# Patient Record
Sex: MALE | Race: WHITE | NOT HISPANIC OR LATINO | Employment: UNEMPLOYED | URBAN - METROPOLITAN AREA
[De-identification: names, ages, dates, MRNs, and addresses within clinical notes are randomized per-mention and may not be internally consistent; named-entity substitution may affect disease eponyms.]

---

## 2018-02-16 ENCOUNTER — OFFICE VISIT (OUTPATIENT)
Dept: OBGYN CLINIC | Facility: CLINIC | Age: 53
End: 2018-02-16
Payer: COMMERCIAL

## 2018-02-16 VITALS
SYSTOLIC BLOOD PRESSURE: 122 MMHG | DIASTOLIC BLOOD PRESSURE: 81 MMHG | BODY MASS INDEX: 27.13 KG/M2 | HEART RATE: 87 BPM | HEIGHT: 68 IN | WEIGHT: 179 LBS

## 2018-02-16 DIAGNOSIS — M70.61 TROCHANTERIC BURSITIS OF RIGHT HIP: Primary | ICD-10-CM

## 2018-02-16 PROCEDURE — 99203 OFFICE O/P NEW LOW 30 MIN: CPT | Performed by: ORTHOPAEDIC SURGERY

## 2018-02-16 NOTE — PROGRESS NOTES
Assessment:     1  Trochanteric bursitis of right hip        Plan:     Problem List Items Addressed This Visit        Musculoskeletal and Integument    Trochanteric bursitis of right hip - Primary     Right hip trochanteric bursitis  Patient was counseled on the diagnosis and treatment options  He did not bring the x-rays in today so was unable to see them  I gave him and counseled him on appropriate stretching  He showed good understanding of this  He would like to try stretching before any injections  He will return in the future if he continues to have problems, but I will need to see x-rays prior to any injections  Subjective:     Patient ID: Natanael William is a 47 y o  male  Chief Complaint:  48-year-old male who comes in today for evaluation of pain in his right hip  This has been going on since late July  It hurts over the lateral aspect of the hip  The pain comes and goes  Things like doing stairs, caring heavy objects aggravated  He can be very painful if he continues to do something that aggravates it  If he sits for 30 seconds or so when it has been aggravated it settles down, but quickly comes back if he continues doing the aggravating activity  He has had no injections  He took tramadol which did not help him at all  At times he does not have any pain in it  It is slowly getting worse  He is anxious to have something done for this  Patient's medical intake form was reviewed      Allergy:  No Known Allergies  Medications:  all current active meds have been reviewed  Past Medical History:  History reviewed  No pertinent past medical history  Past Surgical History:  History reviewed  No pertinent surgical history    Family History:  Family History   Problem Relation Age of Onset    Hypertension Mother     Cancer Father     Hypertension Father      Social History:  History   Alcohol Use    1 2 oz/week    2 Cans of beer per week     History   Drug Use No     History   Smoking Status    Never Smoker   Smokeless Tobacco    Never Used     Review of Systems   Constitutional: Negative  HENT: Negative  Eyes: Negative  Respiratory: Negative  Cardiovascular: Negative  Gastrointestinal: Negative  Endocrine: Negative  Genitourinary: Negative  Musculoskeletal: Positive for arthralgias  Skin: Negative  Allergic/Immunologic: Negative  Neurological: Negative  Hematological: Negative  Psychiatric/Behavioral: Negative  Objective:  BP Readings from Last 1 Encounters:   02/16/18 122/81      Wt Readings from Last 1 Encounters:   02/16/18 81 2 kg (179 lb)      BMI:   Estimated body mass index is 27 22 kg/m² as calculated from the following:    Height as of this encounter: 5' 8" (1 727 m)  Weight as of this encounter: 81 2 kg (179 lb)  BSA:   Estimated body surface area is 1 95 meters squared as calculated from the following:    Height as of this encounter: 5' 8" (1 727 m)  Weight as of this encounter: 81 2 kg (179 lb)  Physical Exam   Constitutional: He is oriented to person, place, and time  He appears well-developed and well-nourished  No distress  HENT:   Head: Normocephalic and atraumatic  Eyes: Right eye exhibits no discharge  Left eye exhibits no discharge  Neck: Normal range of motion  Neck supple  No tracheal deviation present  Cardiovascular: Normal rate and regular rhythm  Pulmonary/Chest: Effort normal  No respiratory distress  Abdominal: Soft  He exhibits no distension  There is no tenderness  Musculoskeletal:   Normal gait   Neurological: He is alert and oriented to person, place, and time  Skin: Skin is warm  He is not diaphoretic  No erythema  Psychiatric: He has a normal mood and affect  His behavior is normal      Right Hip Exam     Tenderness   The patient is experiencing tenderness in the greater trochanter  Range of Motion   The patient has normal right hip ROM      Muscle Strength   The patient has normal right hip strength  Tests   EVELIN: negative    Other   Erythema: absent  Sensation: normal  Pulse: present      Left Hip Exam   Left hip exam is normal     Tenderness   The patient is experiencing no tenderness  Range of Motion   The patient has normal left hip ROM  Muscle Strength   The patient has normal left hip strength  Tests   EVELIN: negative    Other   Erythema: absent  Sensation: normal  Pulse: present            X-rays were not available    Outside x-ray report was reviewed which showed no osteoarthritis and some calcification at the tip of the greater trochanter

## 2018-02-16 NOTE — ASSESSMENT & PLAN NOTE
Right hip trochanteric bursitis  Patient was counseled on the diagnosis and treatment options  He did not bring the x-rays in today so was unable to see them  I gave him and counseled him on appropriate stretching  He showed good understanding of this  He would like to try stretching before any injections  He will return in the future if he continues to have problems, but I will need to see x-rays prior to any injections

## 2021-03-12 ENCOUNTER — APPOINTMENT (EMERGENCY)
Dept: RADIOLOGY | Facility: HOSPITAL | Age: 56
End: 2021-03-12
Payer: COMMERCIAL

## 2021-03-12 ENCOUNTER — HOSPITAL ENCOUNTER (EMERGENCY)
Facility: HOSPITAL | Age: 56
End: 2021-03-12
Attending: EMERGENCY MEDICINE
Payer: COMMERCIAL

## 2021-03-12 ENCOUNTER — HOSPITAL ENCOUNTER (INPATIENT)
Facility: HOSPITAL | Age: 56
LOS: 3 days | Discharge: HOME | DRG: 754 | End: 2021-03-15
Attending: STUDENT IN AN ORGANIZED HEALTH CARE EDUCATION/TRAINING PROGRAM | Admitting: STUDENT IN AN ORGANIZED HEALTH CARE EDUCATION/TRAINING PROGRAM
Payer: COMMERCIAL

## 2021-03-12 VITALS
SYSTOLIC BLOOD PRESSURE: 162 MMHG | DIASTOLIC BLOOD PRESSURE: 97 MMHG | OXYGEN SATURATION: 98 % | TEMPERATURE: 97.9 F | RESPIRATION RATE: 18 BRPM | HEART RATE: 80 BPM

## 2021-03-12 DIAGNOSIS — R45.851 SUICIDAL IDEATIONS: Primary | ICD-10-CM

## 2021-03-12 DIAGNOSIS — R45.851 SUICIDAL IDEATIONS: ICD-10-CM

## 2021-03-12 LAB
ALBUMIN SERPL BCP-MCNC: 4 G/DL (ref 3.5–5)
ALP SERPL-CCNC: 86 U/L (ref 46–116)
ALT SERPL W P-5'-P-CCNC: 43 U/L (ref 12–78)
AMPHETAMINES SERPL QL SCN: NEGATIVE
ANION GAP SERPL CALCULATED.3IONS-SCNC: 10 MMOL/L (ref 4–13)
AST SERPL W P-5'-P-CCNC: 29 U/L (ref 5–45)
ATRIAL RATE: 77 BPM
BARBITURATES UR QL: NEGATIVE
BASOPHILS # BLD AUTO: 0.03 THOUSANDS/ΜL (ref 0–0.1)
BASOPHILS NFR BLD AUTO: 0 % (ref 0–1)
BENZODIAZ UR QL: NEGATIVE
BILIRUB SERPL-MCNC: 1 MG/DL (ref 0.2–1)
BILIRUB UR QL STRIP: NEGATIVE
BUN SERPL-MCNC: 12 MG/DL (ref 5–25)
CALCIUM SERPL-MCNC: 9 MG/DL (ref 8.3–10.1)
CHLORIDE SERPL-SCNC: 101 MMOL/L (ref 100–108)
CLARITY UR: CLEAR
CO2 SERPL-SCNC: 24 MMOL/L (ref 21–32)
COCAINE UR QL: NEGATIVE
COLOR UR: YELLOW
CREAT SERPL-MCNC: 0.87 MG/DL (ref 0.6–1.3)
EOSINOPHIL # BLD AUTO: 0.02 THOUSAND/ΜL (ref 0–0.61)
EOSINOPHIL NFR BLD AUTO: 0 % (ref 0–6)
ERYTHROCYTE [DISTWIDTH] IN BLOOD BY AUTOMATED COUNT: 11.5 % (ref 11.6–15.1)
ETHANOL EXG-MCNC: 0 MG/DL
FLUAV RNA RESP QL NAA+PROBE: NEGATIVE
FLUBV RNA RESP QL NAA+PROBE: NEGATIVE
GFR SERPL CREATININE-BSD FRML MDRD: 96 ML/MIN/1.73SQ M
GLUCOSE SERPL-MCNC: 104 MG/DL (ref 65–140)
GLUCOSE UR STRIP-MCNC: NEGATIVE MG/DL
HCT VFR BLD AUTO: 44.7 % (ref 36.5–49.3)
HGB BLD-MCNC: 15 G/DL (ref 12–17)
HGB UR QL STRIP.AUTO: NEGATIVE
IMM GRANULOCYTES # BLD AUTO: 0.02 THOUSAND/UL (ref 0–0.2)
IMM GRANULOCYTES NFR BLD AUTO: 0 % (ref 0–2)
KETONES UR STRIP-MCNC: ABNORMAL MG/DL
LEUKOCYTE ESTERASE UR QL STRIP: NEGATIVE
LYMPHOCYTES # BLD AUTO: 1.11 THOUSANDS/ΜL (ref 0.6–4.47)
LYMPHOCYTES NFR BLD AUTO: 12 % (ref 14–44)
MCH RBC QN AUTO: 30.7 PG (ref 26.8–34.3)
MCHC RBC AUTO-ENTMCNC: 33.6 G/DL (ref 31.4–37.4)
MCV RBC AUTO: 91 FL (ref 82–98)
METHADONE UR QL: NEGATIVE
MONOCYTES # BLD AUTO: 0.6 THOUSAND/ΜL (ref 0.17–1.22)
MONOCYTES NFR BLD AUTO: 7 % (ref 4–12)
NEUTROPHILS # BLD AUTO: 7.28 THOUSANDS/ΜL (ref 1.85–7.62)
NEUTS SEG NFR BLD AUTO: 81 % (ref 43–75)
NITRITE UR QL STRIP: NEGATIVE
NRBC BLD AUTO-RTO: 0 /100 WBCS
OPIATES UR QL SCN: NEGATIVE
OXYCODONE+OXYMORPHONE UR QL SCN: NEGATIVE
P AXIS: 57 DEGREES
PCP UR QL: NEGATIVE
PH UR STRIP.AUTO: 6 [PH]
PLATELET # BLD AUTO: 176 THOUSANDS/UL (ref 149–390)
PMV BLD AUTO: 9.5 FL (ref 8.9–12.7)
POTASSIUM SERPL-SCNC: 4 MMOL/L (ref 3.5–5.3)
PR INTERVAL: 130 MS
PROT SERPL-MCNC: 6.9 G/DL (ref 6.4–8.2)
PROT UR STRIP-MCNC: NEGATIVE MG/DL
QRS AXIS: 8 DEGREES
QRSD INTERVAL: 86 MS
QT INTERVAL: 364 MS
QTC INTERVAL: 411 MS
RBC # BLD AUTO: 4.89 MILLION/UL (ref 3.88–5.62)
RSV RNA RESP QL NAA+PROBE: NEGATIVE
SARS-COV-2 RNA RESP QL NAA+PROBE: NEGATIVE
SODIUM SERPL-SCNC: 135 MMOL/L (ref 136–145)
SP GR UR STRIP.AUTO: 1.02 (ref 1–1.03)
T WAVE AXIS: 8 DEGREES
THC UR QL: NEGATIVE
TSH SERPL DL<=0.05 MIU/L-ACNC: 0.87 UIU/ML (ref 0.36–3.74)
UROBILINOGEN UR QL STRIP.AUTO: 0.2 E.U./DL
VENTRICULAR RATE: 77 BPM
WBC # BLD AUTO: 9.06 THOUSAND/UL (ref 4.31–10.16)

## 2021-03-12 PROCEDURE — 93010 ELECTROCARDIOGRAM REPORT: CPT | Performed by: INTERNAL MEDICINE

## 2021-03-12 PROCEDURE — 73564 X-RAY EXAM KNEE 4 OR MORE: CPT

## 2021-03-12 PROCEDURE — 99285 EMERGENCY DEPT VISIT HI MDM: CPT | Performed by: EMERGENCY MEDICINE

## 2021-03-12 PROCEDURE — 84443 ASSAY THYROID STIM HORMONE: CPT

## 2021-03-12 PROCEDURE — 73610 X-RAY EXAM OF ANKLE: CPT

## 2021-03-12 PROCEDURE — 0241U HB NFCT DS VIR RESP RNA 4 TRGT: CPT | Performed by: EMERGENCY MEDICINE

## 2021-03-12 PROCEDURE — 80053 COMPREHEN METABOLIC PANEL: CPT

## 2021-03-12 PROCEDURE — 82075 ASSAY OF BREATH ETHANOL: CPT | Performed by: EMERGENCY MEDICINE

## 2021-03-12 PROCEDURE — 81003 URINALYSIS AUTO W/O SCOPE: CPT

## 2021-03-12 PROCEDURE — 99285 EMERGENCY DEPT VISIT HI MDM: CPT

## 2021-03-12 PROCEDURE — 36415 COLL VENOUS BLD VENIPUNCTURE: CPT

## 2021-03-12 PROCEDURE — 80307 DRUG TEST PRSMV CHEM ANLYZR: CPT | Performed by: EMERGENCY MEDICINE

## 2021-03-12 PROCEDURE — 85025 COMPLETE CBC W/AUTO DIFF WBC: CPT

## 2021-03-12 PROCEDURE — 93005 ELECTROCARDIOGRAM TRACING: CPT

## 2021-03-12 RX ORDER — HYDROXYZINE HYDROCHLORIDE 25 MG/1
50 TABLET, FILM COATED ORAL
Status: CANCELLED | OUTPATIENT
Start: 2021-03-12

## 2021-03-12 RX ORDER — IBUPROFEN 600 MG/1
600 TABLET ORAL ONCE
Status: COMPLETED | OUTPATIENT
Start: 2021-03-12 | End: 2021-03-12

## 2021-03-12 RX ORDER — LORAZEPAM 2 MG/ML
2 INJECTION INTRAMUSCULAR
Status: CANCELLED | OUTPATIENT
Start: 2021-03-12

## 2021-03-12 RX ORDER — LORAZEPAM 2 MG/ML
1 INJECTION INTRAMUSCULAR EVERY 4 HOURS PRN
Status: CANCELLED | OUTPATIENT
Start: 2021-03-12

## 2021-03-12 RX ORDER — BENZTROPINE MESYLATE 1 MG/ML
0.5 INJECTION INTRAMUSCULAR; INTRAVENOUS
Status: DISCONTINUED | OUTPATIENT
Start: 2021-03-12 | End: 2021-03-15 | Stop reason: HOSPADM

## 2021-03-12 RX ORDER — HALOPERIDOL 1 MG/1
1 TABLET ORAL EVERY 6 HOURS PRN
Status: CANCELLED | OUTPATIENT
Start: 2021-03-12

## 2021-03-12 RX ORDER — HALOPERIDOL 5 MG
5 TABLET ORAL
Status: CANCELLED | OUTPATIENT
Start: 2021-03-12

## 2021-03-12 RX ORDER — POLYETHYLENE GLYCOL 3350 17 G/17G
17 POWDER, FOR SOLUTION ORAL DAILY PRN
Status: DISCONTINUED | OUTPATIENT
Start: 2021-03-12 | End: 2021-03-15 | Stop reason: HOSPADM

## 2021-03-12 RX ORDER — BISACODYL 10 MG
10 SUPPOSITORY, RECTAL RECTAL DAILY PRN
Status: CANCELLED | OUTPATIENT
Start: 2021-03-12

## 2021-03-12 RX ORDER — BISACODYL 10 MG
10 SUPPOSITORY, RECTAL RECTAL DAILY PRN
Status: DISCONTINUED | OUTPATIENT
Start: 2021-03-12 | End: 2021-03-15 | Stop reason: HOSPADM

## 2021-03-12 RX ORDER — HALOPERIDOL 5 MG
5 TABLET ORAL
Status: DISCONTINUED | OUTPATIENT
Start: 2021-03-12 | End: 2021-03-15 | Stop reason: HOSPADM

## 2021-03-12 RX ORDER — HALOPERIDOL 1 MG/1
1 TABLET ORAL EVERY 6 HOURS PRN
Status: DISCONTINUED | OUTPATIENT
Start: 2021-03-12 | End: 2021-03-15 | Stop reason: HOSPADM

## 2021-03-12 RX ORDER — IBUPROFEN 400 MG/1
400 TABLET ORAL EVERY 6 HOURS PRN
Status: DISCONTINUED | OUTPATIENT
Start: 2021-03-12 | End: 2021-03-15 | Stop reason: HOSPADM

## 2021-03-12 RX ORDER — LORAZEPAM 2 MG/ML
2 INJECTION INTRAMUSCULAR
Status: DISCONTINUED | OUTPATIENT
Start: 2021-03-12 | End: 2021-03-15 | Stop reason: HOSPADM

## 2021-03-12 RX ORDER — IBUPROFEN 600 MG/1
600 TABLET ORAL EVERY 8 HOURS PRN
Status: CANCELLED | OUTPATIENT
Start: 2021-03-12

## 2021-03-12 RX ORDER — MAGNESIUM HYDROXIDE/ALUMINUM HYDROXICE/SIMETHICONE 120; 1200; 1200 MG/30ML; MG/30ML; MG/30ML
30 SUSPENSION ORAL EVERY 4 HOURS PRN
Status: CANCELLED | OUTPATIENT
Start: 2021-03-12

## 2021-03-12 RX ORDER — LORAZEPAM 2 MG/ML
1 INJECTION INTRAMUSCULAR
Status: DISCONTINUED | OUTPATIENT
Start: 2021-03-12 | End: 2021-03-15 | Stop reason: HOSPADM

## 2021-03-12 RX ORDER — BENZTROPINE MESYLATE 1 MG/ML
1 INJECTION INTRAMUSCULAR; INTRAVENOUS
Status: CANCELLED | OUTPATIENT
Start: 2021-03-12

## 2021-03-12 RX ORDER — POLYETHYLENE GLYCOL 3350 17 G/17G
17 POWDER, FOR SOLUTION ORAL DAILY PRN
Status: CANCELLED | OUTPATIENT
Start: 2021-03-12

## 2021-03-12 RX ORDER — HYDROXYZINE HYDROCHLORIDE 25 MG/1
25 TABLET, FILM COATED ORAL
Status: DISCONTINUED | OUTPATIENT
Start: 2021-03-12 | End: 2021-03-15 | Stop reason: HOSPADM

## 2021-03-12 RX ORDER — LORAZEPAM 1 MG/1
1 TABLET ORAL
Status: DISCONTINUED | OUTPATIENT
Start: 2021-03-12 | End: 2021-03-15 | Stop reason: HOSPADM

## 2021-03-12 RX ORDER — MINERAL OIL AND PETROLATUM 150; 830 MG/G; MG/G
1 OINTMENT OPHTHALMIC
Status: CANCELLED | OUTPATIENT
Start: 2021-03-12

## 2021-03-12 RX ORDER — HYDROXYZINE HYDROCHLORIDE 25 MG/1
25 TABLET, FILM COATED ORAL
Status: CANCELLED | OUTPATIENT
Start: 2021-03-12

## 2021-03-12 RX ORDER — HALOPERIDOL 5 MG/ML
5 INJECTION INTRAMUSCULAR
Status: DISCONTINUED | OUTPATIENT
Start: 2021-03-12 | End: 2021-03-15 | Stop reason: HOSPADM

## 2021-03-12 RX ORDER — MAGNESIUM HYDROXIDE/ALUMINUM HYDROXICE/SIMETHICONE 120; 1200; 1200 MG/30ML; MG/30ML; MG/30ML
30 SUSPENSION ORAL EVERY 4 HOURS PRN
Status: DISCONTINUED | OUTPATIENT
Start: 2021-03-12 | End: 2021-03-15 | Stop reason: HOSPADM

## 2021-03-12 RX ORDER — TRAZODONE HYDROCHLORIDE 50 MG/1
50 TABLET ORAL
Status: CANCELLED | OUTPATIENT
Start: 2021-03-12

## 2021-03-12 RX ORDER — AMOXICILLIN 250 MG
1 CAPSULE ORAL DAILY PRN
Status: DISCONTINUED | OUTPATIENT
Start: 2021-03-12 | End: 2021-03-15 | Stop reason: HOSPADM

## 2021-03-12 RX ORDER — LORAZEPAM 2 MG/ML
1 INJECTION INTRAMUSCULAR
Status: CANCELLED | OUTPATIENT
Start: 2021-03-12

## 2021-03-12 RX ORDER — HALOPERIDOL 5 MG/ML
2.5 INJECTION INTRAMUSCULAR
Status: CANCELLED | OUTPATIENT
Start: 2021-03-12

## 2021-03-12 RX ORDER — ACETAMINOPHEN 325 MG/1
650 TABLET ORAL EVERY 6 HOURS PRN
Status: CANCELLED | OUTPATIENT
Start: 2021-03-12

## 2021-03-12 RX ORDER — LORAZEPAM 1 MG/1
1 TABLET ORAL
Status: CANCELLED | OUTPATIENT
Start: 2021-03-12

## 2021-03-12 RX ORDER — IBUPROFEN 400 MG/1
400 TABLET ORAL EVERY 6 HOURS PRN
Status: CANCELLED | OUTPATIENT
Start: 2021-03-12

## 2021-03-12 RX ORDER — HALOPERIDOL 5 MG/ML
2.5 INJECTION INTRAMUSCULAR
Status: DISCONTINUED | OUTPATIENT
Start: 2021-03-12 | End: 2021-03-15 | Stop reason: HOSPADM

## 2021-03-12 RX ORDER — ACETAMINOPHEN 325 MG/1
650 TABLET ORAL EVERY 6 HOURS PRN
Status: DISCONTINUED | OUTPATIENT
Start: 2021-03-12 | End: 2021-03-15 | Stop reason: HOSPADM

## 2021-03-12 RX ORDER — AMOXICILLIN 250 MG
1 CAPSULE ORAL DAILY PRN
Status: CANCELLED | OUTPATIENT
Start: 2021-03-12

## 2021-03-12 RX ORDER — HALOPERIDOL 5 MG
2.5 TABLET ORAL
Status: DISCONTINUED | OUTPATIENT
Start: 2021-03-12 | End: 2021-03-15 | Stop reason: HOSPADM

## 2021-03-12 RX ORDER — BENZTROPINE MESYLATE 1 MG/1
1 TABLET ORAL
Status: DISCONTINUED | OUTPATIENT
Start: 2021-03-12 | End: 2021-03-15 | Stop reason: HOSPADM

## 2021-03-12 RX ORDER — LORAZEPAM 2 MG/ML
1 INJECTION INTRAMUSCULAR EVERY 4 HOURS PRN
Status: DISCONTINUED | OUTPATIENT
Start: 2021-03-12 | End: 2021-03-15 | Stop reason: HOSPADM

## 2021-03-12 RX ORDER — MINERAL OIL AND PETROLATUM 150; 830 MG/G; MG/G
1 OINTMENT OPHTHALMIC
Status: DISCONTINUED | OUTPATIENT
Start: 2021-03-12 | End: 2021-03-15 | Stop reason: HOSPADM

## 2021-03-12 RX ORDER — BENZTROPINE MESYLATE 0.5 MG/1
1 TABLET ORAL
Status: CANCELLED | OUTPATIENT
Start: 2021-03-12

## 2021-03-12 RX ORDER — BENZTROPINE MESYLATE 1 MG/ML
0.5 INJECTION INTRAMUSCULAR; INTRAVENOUS
Status: CANCELLED | OUTPATIENT
Start: 2021-03-12

## 2021-03-12 RX ORDER — BENZTROPINE MESYLATE 1 MG/ML
1 INJECTION INTRAMUSCULAR; INTRAVENOUS
Status: DISCONTINUED | OUTPATIENT
Start: 2021-03-12 | End: 2021-03-15 | Stop reason: HOSPADM

## 2021-03-12 RX ORDER — IBUPROFEN 600 MG/1
600 TABLET ORAL EVERY 8 HOURS PRN
Status: DISCONTINUED | OUTPATIENT
Start: 2021-03-12 | End: 2021-03-15 | Stop reason: HOSPADM

## 2021-03-12 RX ORDER — HALOPERIDOL 5 MG/ML
5 INJECTION INTRAMUSCULAR
Status: CANCELLED | OUTPATIENT
Start: 2021-03-12

## 2021-03-12 RX ORDER — HYDROXYZINE 50 MG/1
50 TABLET, FILM COATED ORAL
Status: DISCONTINUED | OUTPATIENT
Start: 2021-03-12 | End: 2021-03-15 | Stop reason: HOSPADM

## 2021-03-12 RX ORDER — TRAZODONE HYDROCHLORIDE 50 MG/1
50 TABLET ORAL
Status: DISCONTINUED | OUTPATIENT
Start: 2021-03-12 | End: 2021-03-15 | Stop reason: HOSPADM

## 2021-03-12 RX ADMIN — IBUPROFEN 600 MG: 600 TABLET, FILM COATED ORAL at 07:27

## 2021-03-12 RX ADMIN — IBUPROFEN 600 MG: 600 TABLET, FILM COATED ORAL at 17:08

## 2021-03-12 NOTE — ED NOTES
Insurance Authorization for admission:   Phone call placed to [employer] number: [phone]     Spoke to Rajesh   4 days approved  Level of care: inpatient psych  Review on **  Authorization # Accepting facility will need to call for auth number upon admission     EVS (Eligibility Verification System) called - [phone]    Automated system indicates: Brianne Cardona

## 2021-03-12 NOTE — ED PROVIDER NOTES
History  Chief Complaint   Patient presents with    Psychiatric Evaluation     Pt borught in by state police after texting his estranged wife catherine he was going to hang himself  64 yo M presents to ED with police for eval of suicidal threats  He apparently was having an affair, and his wife found out about it  She cleared out bank accounts and moved out, and when he found out today, he sent a text message to son and wife stating he was going to kill himself  Wife called police for a well check, and police found him with a rope next to a chair by the stairs  He came willingly for evaluation  He states to me he was just having a bad day, and that he wouldn't actually hurt himself  Denies homicidal ideation or hallucinations  Is cooperative  Denies smoking/drinking/drugs  History provided by:  Patient and medical records  Psychiatric Evaluation  Presenting symptoms: suicidal thoughts and suicidal threats    Patient accompanied by:  [place] enforcement  Timing:  Constant  Chronicity:  New  Context: stressful life event    Relieved by:  None tried  Worsened by:  Nothing  Ineffective treatments:  None tried  Associated symptoms: no abdominal pain, no anxiety, no chest pain, no fatigue and no headaches        None       History reviewed  No pertinent past medical history  History reviewed  No pertinent surgical history  Family History   Problem Relation Age of Onset    Hypertension Mother     Cancer Father     Hypertension Father      I have reviewed and agree with the history as documented  E-Cigarette/Vaping     E-Cigarette/Vaping Substances     Social History     Tobacco Use    Smoking status: Never Smoker    Smokeless tobacco: Never Used   Substance Use Topics    Alcohol use: Yes     Alcohol/week: 2 0 standard drinks     Types: 2 Cans of beer per week    Drug use: No       Review of Systems   Constitutional: Negative for chills, diaphoresis, fatigue, fever and unexpected weight change     HENT: Negative for congestion, ear pain, rhinorrhea, sore throat, trouble swallowing and voice change  Eyes: Negative for pain and visual disturbance  Respiratory: Negative for cough, chest tightness and shortness of breath  Cardiovascular: Negative for chest pain, palpitations and leg swelling  Gastrointestinal: Negative for abdominal pain, blood in stool, constipation, diarrhea, nausea and vomiting  Genitourinary: Negative for difficulty urinating and hematuria  Musculoskeletal: Negative for arthralgias, back pain and neck pain  Skin: Negative for rash  Neurological: Negative for dizziness, syncope, light-headedness and headaches  Psychiatric/Behavioral: Positive for suicidal ideas  Negative for confusion  The patient is not nervous/anxious  Physical Exam  Physical Exam  Vitals signs and nursing note reviewed  Exam conducted with a chaperone present (nurse Ed)  Constitutional:       General: He is not in acute distress  Appearance: He is well-developed  He is not diaphoretic  HENT:      Head: Normocephalic and atraumatic  Right Ear: External ear normal       Left Ear: External ear normal       Nose: Nose normal    Eyes:      General: No scleral icterus  Right eye: No discharge  Left eye: No discharge  Conjunctiva/sclera: Conjunctivae normal       Pupils: Pupils are equal, round, and reactive to light  Neck:      Musculoskeletal: Normal range of motion and neck supple  Vascular: No JVD  Trachea: No tracheal deviation  Cardiovascular:      Rate and Rhythm: Normal rate and regular rhythm  Heart sounds: Normal heart sounds  No murmur  No friction rub  No gallop  Pulmonary:      Effort: Pulmonary effort is normal  No respiratory distress  Breath sounds: Normal breath sounds  No stridor  No wheezing or rales  Chest:      Chest wall: No tenderness  Abdominal:      General: Bowel sounds are normal  There is no distension        Palpations: Abdomen is soft  Tenderness: There is no abdominal tenderness  There is no guarding or rebound  Musculoskeletal: Normal range of motion  General: No deformity  Right knee: He exhibits normal range of motion, no swelling, no effusion, no ecchymosis, no deformity, no laceration, no erythema, normal alignment, no LCL laxity, normal patellar mobility and no MCL laxity  Tenderness found  Medial joint line tenderness noted  Right ankle: He exhibits normal range of motion, no swelling, no ecchymosis, no deformity, no laceration and normal pulse  Tenderness  Lateral malleolus tenderness found  No medial malleolus, no AITFL, no CF ligament, no posterior TFL, no head of 5th metatarsal and no proximal fibula tenderness found  Lymphadenopathy:      Cervical: No cervical adenopathy  Skin:     General: Skin is warm and dry  Findings: No rash  Neurological:      General: No focal deficit present  Mental Status: He is alert and oriented to person, place, and time  Cranial Nerves: No cranial nerve deficit  Sensory: No sensory deficit        Coordination: Coordination normal    Psychiatric:         Behavior: Behavior normal          Vital Signs  ED Triage Vitals [03/12/21 0514]   Temperature Pulse Respirations Blood Pressure SpO2   97 9 °F (36 6 °C) 99 19 137/72 98 %      Temp Source Heart Rate Source Patient Position - Orthostatic VS BP Location FiO2 (%)   Tympanic Monitor Lying Left arm --      Pain Score       --           Vitals:    03/12/21 0514   BP: 137/72   Pulse: 99   Patient Position - Orthostatic VS: Lying         Visual Acuity  Visual Acuity      Most Recent Value   L Pupil Size (mm)  4   R Pupil Size (mm)  4          ED Medications  Medications   ibuprofen (MOTRIN) tablet 600 mg (has no administration in time range)       Diagnostic Studies  Results Reviewed     Procedure Component Value Units Date/Time    COVID19, Influenza A/B, RSV PCR, [facility] [[id number]] Collected: 03/12/21 0534    Lab Status: In process Specimen: Nares from Nasopharyngeal Swab Updated: 03/12/21 0612    Rapid drug screen, urine [[id number]]  (Normal) Collected: 03/12/21 0527    Lab Status: Final result Specimen: Urine, Clean Catch Updated: 03/12/21 0546     Amph/Meth UR Negative     Barbiturate Ur Negative     Benzodiazepine Urine Negative     Cocaine Urine Negative     Methadone Urine Negative     Opiate Urine Negative     PCP Ur Negative     THC Urine Negative     Oxycodone Urine Negative    Narrative:      FOR MEDICAL PURPOSES ONLY  IF CONFIRMATION NEEDED PLEASE CONTACT THE LAB WITHIN 5 DAYS  Drug Screen Cutoff Levels:  AMPHETAMINE/METHAMPHETAMINES  1000 ng/mL  BARBITURATES     200 ng/mL  BENZODIAZEPINES     200 ng/mL  COCAINE      300 ng/mL  METHADONE      300 ng/mL  OPIATES      300 ng/mL  PHENCYCLIDINE     25 ng/mL  THC       50 ng/mL  OXYCODONE      100 ng/mL    POCT alcohol breath test [[id number]]  (Normal) Resulted: 03/12/21 0534    Lab Status: Final result Updated: 03/12/21 0534     EXTBreath Alcohol 0 00                 XR knee 4+ vw right injury    (Results Pending)   XR ankle 3+ views RIGHT    (Results Pending)              Procedures  Procedures         ED Course  ED Course as of Mar 12 0703   Fri Mar 12, 2021   0724 Faxed 302 to Srutih Mahoney for evaluation  [id number] completed  Belinda Hya is going to send to the [facility]  Pt has been calm and cooperative  Medically cleared  I think he would be willing to sign a 201        0702 Pt now states he tripped on stairs last night, twisted R ankle and knee  No fall or other injury  NV intact  Mild TTP  Will check plain films, motrin                                                 MDM  Number of Diagnoses or Management Options  Suicidal ideations: new and requires workup     Amount and/or Complexity of Data Reviewed  Clinical lab tests: ordered and reviewed  Obtain history from someone other than the patient: yes  Review and summarize past medical records: yes    Risk of Complications, Morbidity, and/or Mortality  Presenting problems: moderate  Diagnostic procedures: low  Management options: low    Patient Progress  Patient progress: stable      Disposition  Final diagnoses:   Suicidal ideations     Time reflects when diagnosis was documented in both MDM as applicable and the Disposition within this note     Time User Action Codes Description Comment    3/12/2021  6:47 AM Xochitl Raw Add [L76 779] Suicidal ideations       ED Disposition     None      Follow-up Information    None         Patient's Medications    No medications on file     No discharge procedures on file      PDMP Review     None          ED Provider  Electronically Signed by           Lauren Robles MD  03/12/21 MD Chet  03/12/21 9623

## 2021-03-12 NOTE — ED NOTES
Transportation arranged with [facility] for 1830       Sumanth Rabago, TIANA  03/12/21   0212

## 2021-03-12 NOTE — EMTALA/ACUTE CARE TRANSFER
[facility] EMERGENCY DEPARTMENT  [address]  [facility] [address]  Dept: [phone]      AWEDQI TRANSFER CONSENT    NAME Gabriel Ramirez                                         1965                              MRN [id number]    I have been informed of my rights regarding examination, treatment, and transfer   by Dr Cuenca att  providers found    Benefits: Specialized equipment and/or services available at the receiving facility (Include comment)________________________    Risks: Potential for delay in receiving treatment      Consent for Transfer:  I acknowledge that my medical condition has been evaluated and explained to me by the emergency department physician or other qualified medical person and/or my attending physician, who has recommended that I be transferred to the service of  Accepting Physician: Dr Emperatriz Edwards at [address] [place] [address] Rd Name, [address] : [address]  The above potential benefits of such transfer, the potential risks associated with such transfer, and the probable risks of not being transferred have been explained to me, and I fully understand them  The doctor has explained that, in my case, the benefits of transfer outweigh the risks  I agree to be transferred  I authorize the performance of emergency medical procedures and treatments upon me in both transit and upon arrival at the receiving facility  Additionally, I authorize the release of any and all medical records to the receiving facility and request they be transported with me, if possible  I understand that the safest mode of transportation during a medical emergency is an ambulance and that the Hospital advocates the use of this mode of transport  Risks of traveling to the receiving facility by car, including absence of medical control, life sustaining equipment, such as oxygen, and medical personnel has been explained to me and I fully understand them      ([address] [place])  [ ]  I consent to the stated transfer and to be transported by ambulance/helicopter  [  ]  I consent to the stated transfer, but refuse transportation by ambulance and accept full responsibility for my transportation by car  I understand the risks of non-ambulance transfers and I exonerate the Hospital and its staff from any deterioration in my condition that results from this refusal     X___________________________________________    DATE  21  TIME________  Signature of patient or legally responsible individual signing on patient behalf           RELATIONSHIP TO PATIENT_________________________          Provider Certification    NAME Abdiel Palomino                                         1965                              MRN [id number]    A medical screening exam was performed on the above named patient  Based on the examination:    Condition Necessitating Transfer The encounter diagnosis was Suicidal ideations  Patient Condition: The patient has been stabilized such that within reasonable medical probability, no material deterioration of the patient condition or the condition of the unborn child(brenden) is likely to result from the transfer    Reason for Transfer: Level of Care needed not available at this facility    Transfer Requirements: [facility] [address] 2w   · Space available and qualified personnel available for treatment as acknowledged by    · Agreed to accept transfer and to provide appropriate medical treatment as acknowledged by       Dr Sadaf Sampson  · Appropriate medical records of the examination and treatment of the patient are provided at the time of transfer   [facility], [address] _______  · Transfer will be performed by qualified personnel from [address] [place] [address]  and appropriate transfer equipment as required, including the use of necessary and appropriate life support measures      Provider Certification: I have examined the patient and explained the following risks and benefits of being transferred/refusing transfer to the patient/family:  General risk, such as traffic hazards, adverse weather conditions, rough terrain or turbulence, possible failure of equipment (including vehicle or aircraft), or consequences of actions of persons outside the control of the transport personnel, Unanticipated needs of medical equipment and personnel during transport, Risk of worsening condition, The possibility of a transport vehicle being unavailable      Based on these reasonable risks and benefits to the patient and/or the unborn child(brenden), and based upon the information available at the time of the patients examination, I certify that the medical benefits reasonably to be expected from the provision of appropriate medical treatments at another medical facility outweigh the increasing risks, if any, to the individuals medical condition, and in the case of labor to the unborn child, from effecting the transfer      X____________________________________________ DATE 03/12/21        TIME_______      ORIGINAL - SEND TO MEDICAL RECORDS   COPY - SEND WITH PATIENT DURING TRANSFER

## 2021-03-12 NOTE — ED NOTES
Pt was brought to the ed after his wife called police for a welfare check  Pt stated his wife found out he was having an affair and took her things and left the home  Pt reports she also locked their bank accounts after taking most of their money  Pt attempted to call and text his wife to discuss the situation but she wouldn't respond  Pt then texted her and his son that he was going to kill himself  The wife then responded and called 911  When police arrived, the found pt next to a chair with an untied rope  Pt said he was not going to kill himself and only staged the rope and chair so his wife would believe that he was actually suicidal  Pt has no psych hx and denies si, hi or hallucinations  Police brought pt to the ed and petitioned a 36 through [employer]  Pt stated the rope wasn't even long enough to hang himself and is requesting d/c home  However, pt admits that he knows the police petitioned a 593 and is willing to sign a 201 for inpatient tx  Pt denies any D&A use  Appetite and sleep are reported as fair  He is currently calm and cooperative  No other stressors were reported

## 2021-03-12 NOTE — ED NOTES
302 completed with [facility] delegate Johnathan Garcia Dr completed and copy was sent to [facility] along with notification form

## 2021-03-12 NOTE — ED NOTES
Pt ambulated to and from restroom without incident  Upon returning to room pt reported that he had pain in his knee, Dr Leo Hernandez notified       Coco Gregorio  03/12/21 0701

## 2021-03-12 NOTE — ED NOTES
Patient is accepted at [facility] 2w  Patient is accepted by [facility] PAUL[facility] Mark per Naz Vergara time to transport      Nurse report is to be called to [phone] prior to patient transfer

## 2021-03-12 NOTE — ED NOTES
Pt is calm and cooperative, currently on the phone with "Heriberto from [facility]"  Breakfast tray ordered        Rosales Contreras  03/12/21 5201

## 2021-03-13 PROBLEM — F43.20 ADJUSTMENT DISORDER: Status: ACTIVE | Noted: 2021-03-13

## 2021-03-13 PROBLEM — F43.21 ADJUSTMENT DISORDER WITH DEPRESSED MOOD: Status: ACTIVE | Noted: 2021-03-13

## 2021-03-13 PROBLEM — S93.401A RIGHT ANKLE SPRAIN: Status: ACTIVE | Noted: 2021-03-13

## 2021-03-13 PROBLEM — Z00.8 MEDICAL CLEARANCE FOR PSYCHIATRIC ADMISSION: Status: ACTIVE | Noted: 2021-03-13

## 2021-03-13 PROCEDURE — 99253 IP/OBS CNSLTJ NEW/EST LOW 45: CPT | Performed by: PHYSICIAN ASSISTANT

## 2021-03-13 PROCEDURE — 99222 1ST HOSP IP/OBS MODERATE 55: CPT | Performed by: STUDENT IN AN ORGANIZED HEALTH CARE EDUCATION/TRAINING PROGRAM

## 2021-03-13 RX ORDER — BENZONATATE 100 MG/1
100 CAPSULE ORAL 3 TIMES DAILY PRN
Status: DISCONTINUED | OUTPATIENT
Start: 2021-03-13 | End: 2021-03-15 | Stop reason: HOSPADM

## 2021-03-13 RX ADMIN — BENZONATATE 100 MG: 100 CAPSULE ORAL at 18:13

## 2021-03-13 RX ADMIN — IBUPROFEN 600 MG: 600 TABLET, FILM COATED ORAL at 09:42

## 2021-03-13 NOTE — PLAN OF CARE
Problem: ANXIETY  Goal: Will report anxiety at manageable levels  Description: INTERVENTIONS:  - Administer medication as ordered  - Teach and encourage coping skills  - Provide emotional support  - Assess patient/family for anxiety and ability to cope  Outcome: Progressing     Problem: Risk for Self Injury/Neglect  Goal: Verbalize thoughts and feelings  Description: Interventions:  - Assess and re-assess patient's lethality and potential for self-injury  - Engage patient in 1:1 interactions, daily, for a minimum of 15 minutes  - Encourage patient to express feelings, fears, frustrations, hopes  - Establish rapport/trust with patient   Outcome: Progressing  Goal: Refrain from harming self  Description: Interventions:  - Monitor patient closely, per order  - Develop a trusting relationship  - Supervise medication ingestion, monitor effects and side effects   Outcome: Progressing     Problem: DEPRESSION  Goal: Will be euthymic at discharge  Description: INTERVENTIONS:  - Administer medication as ordered  - Provide emotional support via 1:1 interaction with staff  - Encourage involvement in milieu/groups/activities  - Monitor for social isolation  Outcome: Progressing

## 2021-03-13 NOTE — CONSULTS
[address] [facility] [address] 1965, 63 y o  male MRN: [id number]  Unit/Bed#: Jonathon Barroso 260-02 Encounter: [id number]  Primary Care Provider: No primary care provider on file  Date and time admitted to hospital: 3/12/2021  6:56 PM    Inpatient consult for Medical Clearance for [address] patient  Consult performed by: Silviano Briones PA-C  Consult ordered by: Theresa Paniagua PA-C          Medical clearance for psychiatric admission  Assessment & Plan  · Admission labs reviewed, acceptable  · COVID-19 negative   ·  UDS negative  · EKG on admission, NSR,    · Medically stable for continued inpatient psychiatric treatment    Right ankle sprain  Assessment & Plan  · Occurred prior to admission   · X-rays in emergency department negative for any fracture   · Continue supportive care with Tylenol and Motrin      VTE Prophylaxis: Reason for no pharmacologic prophylaxis low risk  / reason for no mechanical VTE prophylaxis ambulatory     Recommendations for Discharge:  ·  discharge timeline per primary team   Routine follow-up with primary care provider  Counseling / Coordination of Care Time: 30 minutes  Greater than 50% of total time spent on patient counseling and coordination of care  Collaboration of Care: Were Recommendations Directly Discussed with Primary Treatment Team? - No     History of Present Illness:    Anny Shepherd is a 63 y o  male who is originally admitted to the   Behavioral health service due to  Suicidal ideation  We are consulted for  Management of chronic medical conditions  Patient denies any chronic medical conditions for which he takes daily medications  Patient denies recent travel, illness or sick contacts  Patient denies  Tobacco or drug use, is a social drinker     Available admission lab work and vitals are acceptable  Patient feels a baseline physical health,   With the exception of ankle sprain which was x-rayed in emergency department  Patient appears medically stable for inpatient psychiatric treatment at this time  Review of Systems:    Review of Systems   Musculoskeletal: Positive for arthralgias  All other systems reviewed and are negative  Past Medical and Surgical History:     History reviewed  No pertinent past medical history  No past surgical history on file  Meds/Allergies:    PTA meds:   None       Allergies: No Known Allergies    Social History:     Marital Status: /Civil Union    Substance Use History:   Social History     Substance and Sexual Activity   Alcohol Use Yes    Alcohol/week: 2 0 standard drinks    Types: 2 Cans of beer per week     Social History     Tobacco Use   Smoking Status Never Smoker   Smokeless Tobacco Never Used     Social History     Substance and Sexual Activity   Drug Use No       Family History:    Family History   Problem Relation Age of Onset    Hypertension Mother     Cancer Father     Hypertension Father        Physical Exam:     Vitals:   Blood Pressure: 122/76 (03/13/21 0740)  Pulse: 94 (03/13/21 0740)  Temperature: (!) 97 4 °F (36 3 °C) (03/13/21 0740)  Temp Source: Tympanic (03/13/21 0740)  Respirations: 18 (03/13/21 0740)  Height: 5' 9" (175 3 cm) (03/12/21 1915)  Weight - Scale: 74 4 kg (164 lb) (03/12/21 1915)  SpO2: 97 % (03/12/21 1915)    Physical Exam  Constitutional:       General: He is awake  He is not in acute distress  HENT:      Head: Normocephalic and atraumatic  Cardiovascular:      Rate and Rhythm: Normal rate and regular rhythm  Heart sounds: No murmur  Pulmonary:      Effort: Pulmonary effort is normal       Breath sounds: Normal breath sounds  Abdominal:      General: There is no distension  Palpations: Abdomen is soft  Musculoskeletal:      Right lower leg: No edema  Left lower leg: No edema  Skin:     General: Skin is warm and dry  Neurological:      Mental Status: He is alert        Comments: CN II-XII grossly intact Additional Data:     Lab Results: I have personally reviewed pertinent reports  Results from last 7 days   Lab Units 03/12/21  0926   WBC Thousand/uL 9 06   HEMOGLOBIN g/dL 15 0   HEMATOCRIT % 44 7   PLATELETS Thousands/uL 176   NEUTROS PCT % 81*   LYMPHS PCT % 12*   MONOS PCT % 7   EOS PCT % 0     Results from last 7 days   Lab Units 03/12/21  0926   SODIUM mmol/L 135*   POTASSIUM mmol/L 4 0   CHLORIDE mmol/L 101   CO2 mmol/L 24   BUN mg/dL 12   CREATININE mg/dL 0 87   ANION GAP mmol/L 10   CALCIUM mg/dL 9 0   ALBUMIN g/dL 4 0   TOTAL BILIRUBIN mg/dL 1 00   ALK PHOS U/L 86   ALT U/L 43   AST U/L 29   GLUCOSE RANDOM mg/dL 104             No results found for: HGBA1C            Imaging: I have personally reviewed pertinent reports  No orders to display       EKG, Pathology, and Other Studies Reviewed on Admission:   · EKG: NSR,     ** Please Note: This note has been constructed using a voice recognition system   **

## 2021-03-13 NOTE — PROGRESS NOTES
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

## 2021-03-13 NOTE — PROGRESS NOTES
Pt remains pleasant, calm and cooperative  Visible in the milieu, throughout the day  Social with peers  Attending some groups  Denies SI/HI/AVH  C/o a new cough and chest congestion, MD notified and PRN benzonatate-100mg was ordered  Pt denies questions but is concerned about personal belonging to be brought in by spouse (Belongins did arrive, placed in copy room and awaiting contraband check at time of note)

## 2021-03-13 NOTE — TREATMENT PLAN
RN will meet with the patient at least twice per day to assess any concerns and will give education on diagnosis, prescribed medications, and healthy coping skills  RN will encourage and support heathy coping skills and group attendance

## 2021-03-13 NOTE — TREATMENT TEAM
Pt informed RN will meet with pt at least twice daily to educate on medication, diagnosis and assess for symptoms  Pt given tour of unit

## 2021-03-13 NOTE — PROGRESS NOTES
Pt 201 SLUB-ED for depression after marital issues, wife left the home and patient sent text to wife stating he was going to hang himself hoping she would talk with him  Pt wife call the police  Police found the patient in his home with a chair and rope  Patient reports that the chair and rope were a prop and he did not have intent to hang himself  Police brought pt to the ED  Pt denies drug use and is a non tobacco smoker  He reports drinking "a couple of beers" three to four times a week  UDS and BAT negative  Pt has no psych hx and is not currently on any medications  He denies access to weapons  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

## 2021-03-13 NOTE — ASSESSMENT & PLAN NOTE
· Occurred prior to admission   · X-rays in emergency department negative for any fracture   · Continue supportive care with Tylenol and Motrin

## 2021-03-13 NOTE — TREATMENT PLAN
TREATMENT PLAN REVIEW - 809 Sofiya Garay Born 63 y o  1965 male MRN: [id number]    [address] [place] [address] Room / Bed: [place] [address]/[facility] 199-68 Encounter: [id number]          Admit Date/Time:  3/12/2021  6:56 PM    Treatment Team: Attending Provider: Amy Avalos MD; Consulting Physician: Juan Carlos Agustin MD; Registered Nurse: Kathi Burkett RN; Security: [email or web address]; Patient Care Assistant: Man Chavis;  Patient Care Assistant: Fernando [email or web address]; Patient Care Technician: Sarahy Hassan; Registered Nurse: Ewa Flores RN; Patient Care Assistant: Fawn Barfield    Diagnosis: Principal Problem:    Adjustment disorder with depressed mood  Active Problems:    Medical clearance for psychiatric admission    Right ankle sprain    Patient Strengths/Assets: average or above intelligence, capable of independent living, cooperative, communication skills     Patient Barriers/Limitations: family conflict, marital conflict    Short Term Goals: decrease in depressive symptoms, decrease in anxiety symptoms    Long Term Goals: improvement in depression    Progress Towards Goals: attends groups, participates in milieu therapy    Recommended Treatment: medication management, patient medication education, group therapy, milieu therapy, continued Behavioral Health psychiatric evaluation/assessment process     Treatment Frequency: daily medication monitoring, group and milieu therapy daily, monitoring through interdisciplinary rounds, monitoring through weekly patient care conferences    Expected Discharge Date: 2 days - 3/15/2021    Discharge Plan: referral for outpatient psychotherapy    Treatment Plan Created/Updated By: Sulaiman Lora MD

## 2021-03-13 NOTE — ASSESSMENT & PLAN NOTE
· Admission labs reviewed, acceptable  · COVID-19 negative   ·  UDS negative  · EKG on admission, NSR,    · Medically stable for continued inpatient psychiatric treatment
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
## 2021-03-13 NOTE — PROGRESS NOTES
Pt is pleasant and cooperative  Denies SI/HI/AVH  Denies anxiety and depression  Visible in the milieu, social with peers in the day room  Taking responsibility for actions leading to admission and is hopeful to discharge Monday  Denies further questions/concerns

## 2021-03-13 NOTE — TREATMENT TEAM
AM rounds- new admit, 201, per report, Having marital stressors, texted son vague suicidal statements  Wife called police for welfare check  Police found chair and rope at house  Pt denies intent at suicide and states he staged this for wife to see  First inpatient  Denies current SI        Readmit score-7

## 2021-03-14 PROCEDURE — 99232 SBSQ HOSP IP/OBS MODERATE 35: CPT | Performed by: STUDENT IN AN ORGANIZED HEALTH CARE EDUCATION/TRAINING PROGRAM

## 2021-03-14 RX ADMIN — IBUPROFEN 600 MG: 600 TABLET, FILM COATED ORAL at 12:58

## 2021-03-14 RX ADMIN — TRAZODONE HYDROCHLORIDE 50 MG: 50 TABLET ORAL at 21:13

## 2021-03-14 NOTE — PROGRESS NOTES
Pleasant, calm and cooperative  Slept well over night  Visible and social in the milieu  Attended Community group  Mostly withdrawn to room, reading after group  Denies SI/HI/AVH  Denies concerns  Remains hopeful to discharge tomorrow

## 2021-03-14 NOTE — PROGRESS NOTES
Patient had a drop off that included the following:    Pajama pants  3 pairs of socks  6 robert shirts  3 pairs of underwear  cheez its   Crayons  Glasses  Word search  Coloring book  3 books

## 2021-03-14 NOTE — PROGRESS NOTES
Progress Note - R Srinivas Alcantar [age] y o  male MRN: [id number]  Unit/Bed#: [facility] 260-02 Encounter: [id number]    Subjective:    Per nursing, patient remains pleasant, calm, cooperative, visible in milieu, social with peers, attending some groups  He spends a lot of time reading, eager for discharge  Per patient, patient reports that it was difficult adjusting to the inpatient unit  He reports that his mood has been "fantastic "  He reports sleep has been good  He reports his energy has been good  He has been spending time reading, going to groups  He reports that he has been having talks with family  Patient reports some worries about facing his family, talking to his children  He reports that it will be manageable  He denies any passive or active suicidal ideation, intent, or plan  He reports that appetite has been good  Behavior over the last 24 hours:  improved  Medication side effects: No  ROS: no complaints    Objective:    Temp:  [97 8 °F (36 6 °C)-98 3 °F (36 8 °C)] 98 3 °F (36 8 °C)  HR:  [71-75] 75  Resp:  [18] 18  BP: (141-143)/(80-90) 143/90    Mental Status Evaluation:  Appearance:  sitting comfortably in chair, dressed in casual clothing, adequate hygiene and grooming, cooperative with interview, fairly well related   Behavior:  No tics, tremors, or behaviors observed   Speech:  Normal rate, rhythm, and volume   Mood:  "fantastic"   Affect:  Appears generally euthymic, stable, mood-congruent   Thought Process:  Linear and goal directed   Associations intact associations   Thought Content:  No passive or active suicidal or homicidal ideation, intent, or plan     Perceptual Disturbances: Denies any auditory or visual hallucinations   Sensorium:  Oriented to person, place, time, and situation   Memory:  recent and remote memory grossly intact   Consciousness:  alert and awake   Attention: attention span and concentration were age appropriate   Insight:  fair   Judgment: limited   Gait/Station: normal gait/station   Motor Activity: no abnormal movements       Labs: I have personally reviewed all pertinent laboratory/tests results  Labs in last 72 hours:   Recent Labs     03/12/21  0926   WBC 9 06   RBC 4 89   HGB 15 0   HCT 44 7      RDW 11 5*   NEUTROABS 7 28   SODIUM 135*   K 4 0      CO2 24   BUN 12   CREATININE 0 87   GLUC 104   CALCIUM 9 0   AST 29   ALT 43   ALKPHOS 86   TP 6 9   ALB 4 0   TBILI 1 00   [id number] 0 868       Progress Toward Goals: Progressing    Recommended Treatment: Continue with group therapy, milieu therapy and occupational therapy  Risks, benefits and possible side effects of Medications:   Risks, benefits, and possible side effects of medications explained to patient and patient verbalizes understanding  Medications: all current active meds have been reviewed    Current Facility-Administered Medications   Medication Dose Route Frequency Provider Last Rate    acetaminophen  650 mg Oral Q6H PRN Jenifer Knight MD      aluminum-magnesium hydroxide-simethicone  30 mL Oral Q4H PRN Jenifer Knight MD      artificial tear  1 application Both Eyes P9M PRN Jenifer Knight MD      benzonatate  100 mg Oral TID PRN Jenifer Knight MD      haloperidol lactate  2 5 mg Intramuscular Q4H PRN Max 4/day Jenifer Knight MD      And    LORazepam  1 mg Intramuscular Q4H PRN Max 4/day Jenifer Knight MD      And    benztropine  0 5 mg Intramuscular Q4H PRN Max 4/day Jenifer Knight MD      haloperidol lactate  5 mg Intramuscular Q4H PRN Max 4/day Jenifer Knight MD      And    LORazepam  2 mg Intramuscular Q4H PRN Max 4/day Jenifer Knight MD      And    benztropine  1 mg Intramuscular Q4H PRN Max 4/day Jenifer Knight MD      benztropine  1 mg Intramuscular Q4H PRN Max 6/day Jenifer Knight MD      benztropine  1 mg Oral Q4H PRN Max 6/day Jenifer Knight MD      bisacodyl  10 mg Rectal Daily PRN Jenifer Knight MD      haloperidol  1 mg Oral Q6H PRN Parvez Zhao MD      haloperidol  2 5 mg Oral Q4H PRN Max 4/day Parvez Zhao MD      haloperidol  5 mg Oral Q4H PRN Max 4/day Parvez Zhao MD      hydrOXYzine HCL  25 mg Oral Q6H PRN Max 4/day Parvez Zhao MD      hydrOXYzine HCL  50 mg Oral Q4H PRN Max 4/day Parvez Zhao MD      Or    LORazepam  1 mg Intramuscular Q4H PRN Parvez Zhao MD      ibuprofen  400 mg Oral Q6H PRN Parvez Zhao MD      ibuprofen  600 mg Oral Q8H PRN Parvez Zhao MD      influenza vaccine  0 5 mL Intramuscular Once Parvez Zhao MD      LORazepam  1 mg Oral Q4H PRN Max 6/day Parvez Zhao MD      Or    LORazepam  2 mg Intramuscular Q6H PRN Max 3/day Parvez Zhao MD      polyethylene glycol  17 g Oral Daily PRN Parvez Zhao MD      senna-docusate sodium  1 tablet Oral Daily PRN Parvez Zhao MD      traZODone  50 mg Oral HS PRN Parvez Zhao MD             Assessment/Plan   Principal Problem:    Adjustment disorder with depressed mood  Active Problems:    Medical clearance for psychiatric admission    Right ankle sprain    62 y/o Male with adjustment d/o- continues to have improvements in mood symptoms, reconciling things with family, denying SI    Plan:  -Continue current therapeutic milieu   -Continue to monitor mood symptoms  -CW to work on [address] [place] [address] [employer], obtaining collateral from family

## 2021-03-15 VITALS
HEIGHT: 69 IN | BODY MASS INDEX: 24.29 KG/M2 | SYSTOLIC BLOOD PRESSURE: 129 MMHG | WEIGHT: 164 LBS | TEMPERATURE: 97.5 F | RESPIRATION RATE: 18 BRPM | HEART RATE: 90 BPM | OXYGEN SATURATION: 97 % | DIASTOLIC BLOOD PRESSURE: 71 MMHG

## 2021-03-15 PROCEDURE — 90682 RIV4 VACC RECOMBINANT DNA IM: CPT | Performed by: STUDENT IN AN ORGANIZED HEALTH CARE EDUCATION/TRAINING PROGRAM

## 2021-03-15 PROCEDURE — 99239 HOSP IP/OBS DSCHRG MGMT >30: CPT | Performed by: STUDENT IN AN ORGANIZED HEALTH CARE EDUCATION/TRAINING PROGRAM

## 2021-03-15 PROCEDURE — 90471 IMMUNIZATION ADMIN: CPT | Performed by: STUDENT IN AN ORGANIZED HEALTH CARE EDUCATION/TRAINING PROGRAM

## 2021-03-15 RX ADMIN — INFLUENZA A VIRUS A/HAWAII/70/2019 (H1N1) RECOMBINANT HEMAGGLUTININ ANTIGEN, INFLUENZA A VIRUS A/MINNESOTA/41/2019 (H3N2) RECOMBINANT HEMAGGLUTININ ANTIGEN, INFLUENZA B VIRUS B/WASHINGTON/02/2019 RECOMBINANT HEMAGGLUTININ ANTIGEN, AND INFLUENZA B VIRUS B/PHUKET/3073/2013 RECOMBINANT HEMAGGLUTININ ANTIGEN 0.5 ML: 45; 45; 45; 45 INJECTION INTRAMUSCULAR at 09:23

## 2021-03-15 NOTE — CASE MANAGEMENT
CM met with pt to complete intake and sign MARIELENA for dc planning  Pt was admitted on: Friday 3/12/2021 from [place] [address] ED    Reasons for admission/stressors:  ED note 3/12/2021:    "62 yo M presents to ED with police for eval of suicidal threats  He apparently was having an affair, and his wife found out about it  She cleared out bank accounts and moved out, and when he found out today, he sent a text message to son and wife stating he was going to kill himself  Wife called police for a well check, and police found him with a rope next to a chair by the stairs  He came willingly for evaluation  He states to me he was just having a bad day, and that he wouldn't actually hurt himself  Denies homicidal ideation or hallucinations  Is cooperative  Denies smoking/drinking/drugs "    At time of intake: Pt was alert, calm and cooperative during intake  Pt reported "I am feeling great and ready to go home " Pt spoke to CM about his situation and reported "my wife brought in a book that I read yesterday that talks about decisions and how to make different ones in life " Pt reported feeling interested in therapy and "diving into my feelings "     Pt wishes to return to/with: Pt lives with wife     Outpatient Services: Pt is not connected with Outpatient but is very interested in Therapy after dc  Pt reported that he and his wife went to [facility] many years ago for couples counseling and they did not feel the therapist was a match  Pt open to trying [facility] again or being scheduled with a different therapist      Current medications: None     D&A Resources/Rehab?: N/A    UDS: Negative   BAT: Negative     PCP: N/A - Pt reported "I rarely go have issues that I need to see a doctor"   ICM: N/A    Prior IP Treatment: N/A - pt's first inpatient hospitalization  Medical Concerns: None   Pt reporting being overall healthy     Insurance: [facility]     Legal Issues: Denies     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

## 2021-03-15 NOTE — PROGRESS NOTES
Diagnosis of Adjustment disorder with depressed mood reviewed  Short term goals for decrease in depressive symptoms, decrease in anxiety symptoms discussed  Pt to dc home today  Pt vehicle in [place] parking lot  Pt is not connected with OP but is open to an appointment with a therapist      All parties in agreement and treatment plan signed          03/15/21 0902   Team Meeting   Meeting Type Tx Team Meeting   Team Members Present   Team Members Present Physician;Nurse;   Physician Team Member Dr Marleny Harmon Team Member [address] Management Team Member Felipa   Patient/Family Present   Patient Present Yes   Patient's Family Present No

## 2021-03-15 NOTE — PROGRESS NOTES
Awake and alert  Social with peers and reading a book at times  Expressed readiness for discharge  States his wife dropped off his car in the parking lot last evening  Denies SI/HI  Pleasant and cooperative during interaction  No current medications  Expressed interest in therapy upon discharge  Discussed healthy coping skills plans to improve upon communication and enjoys fishing on his boat in [place]   No questions or concerns

## 2021-03-15 NOTE — DISCHARGE SUMMARY
Discharge Summary - R Srinivas Alcantar [age] y o  male MRN: [id number]  Unit/Bed#: [facility] 260-02 Encounter: [id number]     Admission Date:   Admission Orders (From admission, onward)     Ordered        03/12/21 1907  ED TO DIFFERENT CAMPUS  [address] UNIT or INPATIENT MEDICAL UNIT to [facility] UNIT (using Discharge Readmit Navigator) - Admit Patient to [address] [place] [address]  Once                         Discharge Date: 3/15/2021    Attending Psychiatrist: Neal Shea MD    Reason for Admission/HPI:   History of Present Illness   Patient is a 67-year-old male who presented to [facility] ED due to suicidal behavior  Apparently patient was texting his estranged wife that he would hang himself  He recently had an affair, his wife found out about it, she cleared out bank accounts, moved out, when he found out about this, he sent a text to his wife and son, and stated he wanted to kill himself  His wife called police for a welfare check, police found him with a rope tied next to a chair by the stairs  He stated in the ED that he was having a bad day and did not actually want to hurt himself  During crisis evaluation he reported that after he found out his wife locked their bank accounts she would not answer her phone  He continued to try to discuss the situation with his wife but she would not respond  He explicitly stated that he was not going to kill himself, staged the rope and chair so his wife would believe that he was actually suicidal   He denied any previous psychiatric history or suicide attempts in the past   At that time he was focused on being discharged back home but did agree for safety observation on inpatient psychiatric unit  On initial psychiatric evaluation patient did report having a lot of feelings of guilt  He denied all other depressive symptoms  He did report mild anxiety but denied panic attacks    He denied psychosis, delusional material, showed no signs of israel, and was overall cooperative  Again patient has no previous psychiatric history      Psychosocial Stressors: marital, financial    Hospital Course:   Behavioral Health Medications:   current meds:   Current Facility-Administered Medications   Medication Dose Route Frequency    acetaminophen (TYLENOL) tablet 650 mg  650 mg Oral Q6H PRN    aluminum-magnesium hydroxide-simethicone (MYLANTA) oral suspension 30 mL  30 mL Oral Q4H PRN    artificial tear (LUBRIFRESH P M ) ophthalmic ointment 1 application  1 application Both Eyes H1A PRN    benzonatate (TESSALON PERLES) capsule 100 mg  100 mg Oral TID PRN    haloperidol lactate (HALDOL) injection 2 5 mg  2 5 mg Intramuscular Q4H PRN Max 4/day    And    LORazepam (ATIVAN) injection 1 mg  1 mg Intramuscular Q4H PRN Max 4/day    And    benztropine (COGENTIN) injection 0 5 mg  0 5 mg Intramuscular Q4H PRN Max 4/day    haloperidol lactate (HALDOL) injection 5 mg  5 mg Intramuscular Q4H PRN Max 4/day    And    LORazepam (ATIVAN) injection 2 mg  2 mg Intramuscular Q4H PRN Max 4/day    And    benztropine (COGENTIN) injection 1 mg  1 mg Intramuscular Q4H PRN Max 4/day    benztropine (COGENTIN) injection 1 mg  1 mg Intramuscular Q4H PRN Max 6/day    benztropine (COGENTIN) tablet 1 mg  1 mg Oral Q4H PRN Max 6/day    bisacodyl (DULCOLAX) rectal suppository 10 mg  10 mg Rectal Daily PRN    haloperidol (HALDOL) tablet 1 mg  1 mg Oral Q6H PRN    haloperidol (HALDOL) tablet 2 5 mg  2 5 mg Oral Q4H PRN Max 4/day    haloperidol (HALDOL) tablet 5 mg  5 mg Oral Q4H PRN Max 4/day    hydrOXYzine HCL (ATARAX) tablet 25 mg  25 mg Oral Q6H PRN Max 4/day    hydrOXYzine HCL (ATARAX) tablet 50 mg  50 mg Oral Q4H PRN Max 4/day    Or    LORazepam (ATIVAN) injection 1 mg  1 mg Intramuscular Q4H PRN    ibuprofen (MOTRIN) tablet 400 mg  400 mg Oral Q6H PRN    ibuprofen (MOTRIN) tablet 600 mg  600 mg Oral Q8H PRN    LORazepam (ATIVAN) tablet 1 mg  1 mg Oral Q4H PRN Max 6/day    Or    LORazepam (ATIVAN) injection 2 mg  2 mg Intramuscular Q6H PRN Max 3/day    polyethylene glycol (MIRALAX) packet 17 g  17 g Oral Daily PRN    senna-docusate sodium (SENOKOT S) 8 6-50 mg per tablet 1 tablet  1 tablet Oral Daily PRN    traZODone (DESYREL) tablet 50 mg  50 mg Oral HS PRN       Patient was admitted to [address] [place] [address] inpatient psychiatric unit on voluntary 201 commitment for safety and stabilization  On admission patient was placed on no medications and had safety observation  He did wish to have referral for outpatient psychotherapy  He did take Trazodone 50mg HS PRN for insomnia once, which was effective  His mood brightened over the course of his treatment, and he was seen in [facility] interacting appropriately with peers  He did attend groups  He did not demonstrate dangerous behavior to self or others during his inpatient stay  On day of discharge patient denied depression, had controllable anxiety, denied psychosis, did not show signs of israel, and denied suicidal/homicidal ideations  Mental Status at time of Discharge:     Appearance:  casually dressed   Behavior:  Cooperative   Speech:  normal pitch and normal volume   Mood:  euthymic   Affect:  mood-congruent   Thought Process:  normal   Thought Content:  normal   Perceptual Disturbances: None   Risk Potential: Denied SI/HI  Potential for aggression no   Sensorium:  person, place and time/date   Cognition:  recent and remote memory grossly intact   Consciousness:  alert and awake    Attention: attention span and concentration were age appropriate   Insight:  fair   Judgment: fair   Gait/Station: normal gait/station and normal balance   Motor Activity: no abnormal movements       Discharge Diagnosis:   Adjustment disorder with depressed mood      Discharge Medications:  See after visit summary for reconciled discharge medications provided to patient and family        Discharge instructions/Information to patient and family: See after visit summary for information provided to patient and family  Provisions for Follow-Up Care:  See after visit summary for information related to follow-up care and any pertinent home health orders  Discharge Statement   I spent 33 minutes discharging the patient  This time was spent on the day of discharge  I had direct contact with the patient on the day of discharge  On day of discharge patient had mental status exam performed, discharge instructions/medications reviewed, and outpatient planning discussed  He was given no scripts        George Prado PA-C

## 2021-03-15 NOTE — CASE MANAGEMENT
3/15/2021 2:10pm-     CM called Avery Cannon (Intake Director) at [facility] - [phone] to see if they can schedule pt for an intake for Outpatient Therapy  LORIN left voicemail asking for her to call back  3/18/2021 10:30am-   CM called pt to check in to see if he received a call from [facility] for an Outpatient appointment  Pt did not answer and CM left voicemail  LORIN called Shaye Hi  at [facility] ([phone]) to check in to see if he was able to schedule pt for an appointment  LORIN rothman and asked him to call back       3/18/2021 11:05am- Christophann Record from [facility] called back and confirmed that pt has been scheduled for intake appointment for therapy Today, Thursday 3/18/2021 at 3pm

## 2021-03-15 NOTE — PLAN OF CARE
Problem: DISCHARGE PLANNING  Goal: Discharge to home or other facility with appropriate resources  Description: INTERVENTIONS:  - Identify barriers to discharge w/patient and caregiver  - Arrange for needed discharge resources and transportation as appropriate  - Identify discharge learning needs (meds, wound care, etc )  - Arrange for interpretive services to assist at discharge as needed  - Refer to Case Management Department for coordinating discharge planning if the patient needs post-hospital services based on physician/advanced practitioner order or complex needs related to functional status, cognitive ability, or social support system  Outcome: Adequate for Discharge     Problem: ANXIETY  Goal: Will report anxiety at manageable levels  Description: INTERVENTIONS:  - Administer medication as ordered  - Teach and encourage coping skills  - Provide emotional support  - Assess patient/family for anxiety and ability to cope  Outcome: Adequate for Discharge     Problem: DEPRESSION  Goal: Will be euthymic at discharge  Description: INTERVENTIONS:  - Administer medication as ordered  - Provide emotional support via 1:1 interaction with staff  - Encourage involvement in milieu/groups/activities  - Monitor for social isolation  Outcome: Adequate for Discharge     Problem: Ineffective Coping  Goal: Identifies healthy coping skills  Outcome: Adequate for Discharge     Problem: Risk for Self Injury/Neglect  Goal: Verbalize thoughts and feelings  Description: Interventions:  - Assess and re-assess patient's lethality and potential for self-injury  - Engage patient in 1:1 interactions, daily, for a minimum of 15 minutes  - Encourage patient to express feelings, fears, frustrations, hopes  - Establish rapport/trust with patient   Outcome: Adequate for Discharge  Goal: Refrain from harming self  Description: Interventions:  - Monitor patient closely, per order  - Develop a trusting relationship  - Supervise medication ingestion, monitor effects and side effects   Outcome: Adequate for Discharge     Expressed readiness for discharge

## 2021-03-15 NOTE — BH TRANSITION RECORD
Contact Information: If you have any questions, concerns, pended studies, tests and/or procedures, or emergencies regarding your inpatient behavioral health visit  Please contact [place] [facility] [phone] and ask to speak to a , nurse or physician  A contact is available 24 hours/ 7 days a week at this number  Summary of Procedures Performed During your Stay:  Below is a list of major procedures performed during your hospital stay and a summary of results:  - Cardiac Procedures/Studies: EKG  Pending Studies (From admission, onward)    None        If studies are pending at discharge, follow up with your PCP and/or referring provider

## 2021-03-15 NOTE — DISCHARGE INSTR - OTHER ORDERS
You will be discharged today to your home  Your wife dropped off your vehicle and it is in the parking lot  At time of your discharge, your hospital  was working on scheduling you with an Outpatient Therapist per your request  This did not occur before you were discharged, your Hospital  will call you with an appointment date and time  [address] [facility] 24-hour Crisis Intervention Services [phone]              [address] [facility] Area:                [address] [place] [address]:                  [phone]                 - [facility] Line:                              [address]:                  [phone]    [facility] Crisis Support       [phone]  National Suicide Prevention Lifeline      1-453 [address] [facility] after Hours Emergency #            1-790.882.6167  press [facility]      [phone]  [address] [facility]                                        [phone]  (Network of Victim Assistance)  [address] [place] [address] Road                      [phone]  24hr   Domestic [place] on Aging (Elder Abuse #) Zoya [address] Department of Mental Health/Developmental Programs: Domi Syed [facility]: [address]                     [phone]  TDD Line                                           [phone] [address] [facility] Dept  of Mental Health/Developmental Programs [phone]  Information and referrals for case management, residential, and employment services      The following agencies provide services to people with no Insurance and those with [facility]:  Chen                             [phone]  [facility] Human Services                                    [phone]  [facility] Case Management)  [facility] [place] SITE                       [address]                                     [phone]    The following agencies provide services and supports to people with Medical Assistance and Insurance:  [facility] [place] [address]  [facility]                               [phone]  Consumer/Family Satisfaction Team    The [place] of [address] [place] [address] N    [phone]

## 2021-03-15 NOTE — PROGRESS NOTES
Status: New admission  Pt reported marital issues and stressors  Pt sent text to his wife that he planned to hang self, wife called police  Pt reported he did not intend to kill self, just wanted wife to talk to him  Pt pleasant, calm and cooperative upon admission  UDS and BAT negative      Medication: No medications / PRN: Motrin, Trazodone     D/C: Pt hopeful for dc today ?     03/15/21 0808   Team Meeting   Meeting Type Daily Rounds   Team Members Present   Team Members Present Physician;Nurse;   Physician Team Member Dr Briseyda Bishop / Preston Qiu / Concha Romberg Team Member Brinda Boudreaux / Viv Grady Management Team Member Sandy Herrera / Tom Abraham   Patient/Family Present   Patient Present No   Patient's Family Present No

## 2022-02-21 NOTE — PROGRESS NOTES
Remains pleasant and cooperative  Mostly isolative to self/room reading a book  Denies SI/HI/AVH  Denies questions/concerns  there is no weight noted from his visit on February 16th    Please try to obtain his weight     The dose of IVIG would be 1 gram/kilogram over three days infused over 4 hr with 250 cc of IV fluids prior  with Tylenol 1000 mg and Benadryl 25 mg prior to IVGG    Please call Brenna Solo to try to get this approved and scheduled

## 2024-06-10 ENCOUNTER — APPOINTMENT (OUTPATIENT)
Dept: RADIOLOGY | Facility: HOSPITAL | Age: 59
End: 2024-06-10
Payer: COMMERCIAL

## 2024-06-10 ENCOUNTER — HOSPITAL ENCOUNTER (EMERGENCY)
Facility: HOSPITAL | Age: 59
Discharge: HOME | End: 2024-06-10
Attending: EMERGENCY MEDICINE | Admitting: EMERGENCY MEDICINE
Payer: COMMERCIAL

## 2024-06-10 ENCOUNTER — APPOINTMENT (EMERGENCY)
Dept: CT IMAGING | Facility: HOSPITAL | Age: 59
End: 2024-06-10
Payer: COMMERCIAL

## 2024-06-10 VITALS
BODY MASS INDEX: 24.37 KG/M2 | WEIGHT: 165 LBS | SYSTOLIC BLOOD PRESSURE: 157 MMHG | RESPIRATION RATE: 17 BRPM | OXYGEN SATURATION: 98 % | HEART RATE: 66 BPM | DIASTOLIC BLOOD PRESSURE: 94 MMHG | TEMPERATURE: 98 F

## 2024-06-10 DIAGNOSIS — I16.0 HYPERTENSIVE URGENCY: ICD-10-CM

## 2024-06-10 DIAGNOSIS — R07.9 CHEST PAIN: Primary | ICD-10-CM

## 2024-06-10 LAB
2HR DELTA HS TROPONIN: 1 NG/L
ALBUMIN SERPL BCP-MCNC: 4.4 G/DL (ref 3.5–5)
ALP SERPL-CCNC: 68 U/L (ref 34–104)
ALT SERPL W P-5'-P-CCNC: 32 U/L (ref 7–52)
ANION GAP SERPL CALCULATED.3IONS-SCNC: 8 MMOL/L (ref 4–13)
AST SERPL W P-5'-P-CCNC: 25 U/L (ref 13–39)
ATRIAL RATE: 67 BPM
BASOPHILS # BLD AUTO: 0.04 THOUSANDS/ÂΜL (ref 0–0.1)
BASOPHILS NFR BLD AUTO: 1 % (ref 0–1)
BILIRUB SERPL-MCNC: 0.81 MG/DL (ref 0.2–1)
BUN SERPL-MCNC: 18 MG/DL (ref 5–25)
CALCIUM SERPL-MCNC: 9.1 MG/DL (ref 8.4–10.2)
CARDIAC TROPONIN I PNL SERPL HS: 5 NG/L
CARDIAC TROPONIN I PNL SERPL HS: 6 NG/L
CHLORIDE SERPL-SCNC: 101 MMOL/L (ref 96–108)
CO2 SERPL-SCNC: 24 MMOL/L (ref 21–32)
CREAT SERPL-MCNC: 0.83 MG/DL (ref 0.6–1.3)
D DIMER PPP FEU-MCNC: <0.27 UG/ML FEU
EOSINOPHIL # BLD AUTO: 0.11 THOUSAND/ÂΜL (ref 0–0.61)
EOSINOPHIL NFR BLD AUTO: 2 % (ref 0–6)
ERYTHROCYTE [DISTWIDTH] IN BLOOD BY AUTOMATED COUNT: 11.4 % (ref 11.6–15.1)
GFR SERPL CREATININE-BSD FRML MDRD: 96 ML/MIN/1.73SQ M
GLUCOSE SERPL-MCNC: 98 MG/DL (ref 65–140)
HCT VFR BLD AUTO: 43.3 % (ref 36.5–49.3)
HGB BLD-MCNC: 14.6 G/DL (ref 12–17)
IMM GRANULOCYTES # BLD AUTO: 0.02 THOUSAND/UL (ref 0–0.2)
IMM GRANULOCYTES NFR BLD AUTO: 0 % (ref 0–2)
LYMPHOCYTES # BLD AUTO: 1.77 THOUSANDS/ÂΜL (ref 0.6–4.47)
LYMPHOCYTES NFR BLD AUTO: 29 % (ref 14–44)
MCH RBC QN AUTO: 31.3 PG (ref 26.8–34.3)
MCHC RBC AUTO-ENTMCNC: 33.7 G/DL (ref 31.4–37.4)
MCV RBC AUTO: 93 FL (ref 82–98)
MONOCYTES # BLD AUTO: 0.61 THOUSAND/ÂΜL (ref 0.17–1.22)
MONOCYTES NFR BLD AUTO: 10 % (ref 4–12)
NEUTROPHILS # BLD AUTO: 3.52 THOUSANDS/ÂΜL (ref 1.85–7.62)
NEUTS SEG NFR BLD AUTO: 58 % (ref 43–75)
NRBC BLD AUTO-RTO: 0 /100 WBCS
P AXIS: 51 DEGREES
PLATELET # BLD AUTO: 167 THOUSANDS/UL (ref 149–390)
PMV BLD AUTO: 9.8 FL (ref 8.9–12.7)
POTASSIUM SERPL-SCNC: 3.9 MMOL/L (ref 3.5–5.3)
PR INTERVAL: 122 MS
PROT SERPL-MCNC: 7.1 G/DL (ref 6.4–8.4)
QRS AXIS: 31 DEGREES
QRSD INTERVAL: 86 MS
QT INTERVAL: 382 MS
QTC INTERVAL: 403 MS
RBC # BLD AUTO: 4.66 MILLION/UL (ref 3.88–5.62)
SODIUM SERPL-SCNC: 133 MMOL/L (ref 135–147)
T WAVE AXIS: 17 DEGREES
TSH SERPL DL<=0.05 MIU/L-ACNC: 1.54 UIU/ML (ref 0.45–4.5)
VENTRICULAR RATE: 67 BPM
WBC # BLD AUTO: 6.07 THOUSAND/UL (ref 4.31–10.16)

## 2024-06-10 PROCEDURE — 96376 TX/PRO/DX INJ SAME DRUG ADON: CPT

## 2024-06-10 PROCEDURE — 96374 THER/PROPH/DIAG INJ IV PUSH: CPT

## 2024-06-10 PROCEDURE — 70450 CT HEAD/BRAIN W/O DYE: CPT

## 2024-06-10 PROCEDURE — 84484 ASSAY OF TROPONIN QUANT: CPT | Performed by: EMERGENCY MEDICINE

## 2024-06-10 PROCEDURE — 99285 EMERGENCY DEPT VISIT HI MDM: CPT

## 2024-06-10 PROCEDURE — 36415 COLL VENOUS BLD VENIPUNCTURE: CPT

## 2024-06-10 PROCEDURE — 71046 X-RAY EXAM CHEST 2 VIEWS: CPT

## 2024-06-10 PROCEDURE — 84443 ASSAY THYROID STIM HORMONE: CPT

## 2024-06-10 PROCEDURE — 93005 ELECTROCARDIOGRAM TRACING: CPT

## 2024-06-10 PROCEDURE — 74174 CTA ABD&PLVS W/CONTRAST: CPT

## 2024-06-10 PROCEDURE — 85379 FIBRIN DEGRADATION QUANT: CPT

## 2024-06-10 PROCEDURE — 71275 CT ANGIOGRAPHY CHEST: CPT

## 2024-06-10 PROCEDURE — 85025 COMPLETE CBC W/AUTO DIFF WBC: CPT | Performed by: EMERGENCY MEDICINE

## 2024-06-10 PROCEDURE — 93010 ELECTROCARDIOGRAM REPORT: CPT | Performed by: INTERNAL MEDICINE

## 2024-06-10 PROCEDURE — 80053 COMPREHEN METABOLIC PANEL: CPT | Performed by: EMERGENCY MEDICINE

## 2024-06-10 RX ORDER — METOPROLOL SUCCINATE 25 MG/1
25 TABLET, EXTENDED RELEASE ORAL ONCE
Status: COMPLETED | OUTPATIENT
Start: 2024-06-10 | End: 2024-06-10

## 2024-06-10 RX ORDER — LABETALOL HYDROCHLORIDE 5 MG/ML
10 INJECTION, SOLUTION INTRAVENOUS ONCE
Status: COMPLETED | OUTPATIENT
Start: 2024-06-10 | End: 2024-06-10

## 2024-06-10 RX ORDER — METOPROLOL SUCCINATE 25 MG/1
25 TABLET, EXTENDED RELEASE ORAL 2 TIMES DAILY
Qty: 60 TABLET | Refills: 0 | Status: SHIPPED | OUTPATIENT
Start: 2024-06-11 | End: 2024-07-11

## 2024-06-10 RX ADMIN — LABETALOL HYDROCHLORIDE 10 MG: 5 INJECTION, SOLUTION INTRAVENOUS at 18:43

## 2024-06-10 RX ADMIN — METOPROLOL SUCCINATE 25 MG: 25 TABLET, EXTENDED RELEASE ORAL at 19:36

## 2024-06-10 RX ADMIN — IOHEXOL 100 ML: 350 INJECTION, SOLUTION INTRAVENOUS at 17:31

## 2024-06-10 RX ADMIN — LABETALOL HYDROCHLORIDE 10 MG: 5 INJECTION, SOLUTION INTRAVENOUS at 17:15

## 2024-06-10 NOTE — ED PROVIDER NOTES
"History  Chief Complaint   Patient presents with    Chest Pain     Pt presents to the ED with chest discomfort. Pt feels like chest is fluttery. Pt reports dizziness, lightheadedness, CP, jaw pain. Pt is sob. Pt has been feeling this since this AM around 1100.     The patient is a 58-year-old male with PMH of HTN presenting for evaluation of chest discomfort, shortness of breath, lightheadedness, dizziness, and heart fluttering.  The patient notes over the past few months having episodes of chest discomfort with associated irregular heartbeat/heart fluttering.  He notes that these occur approximately 1-2 times per week.  This morning, at approximately 11 AM (3 hours PTA) he started with midsternal chest discomfort as well as slight shortness of breath.  Notes feeling lightheaded, woozy, and slightly off balance when up and moving around.  He denies associated headaches or vision changes.  He is prescribed metoprolol 25 mg for his blood pressure but he takes it regularly with taking approximately once or twice per week when he thinks of it.  He denies other medication use.  He notes he otherwise has been in his usual state of health denies recent illnesses, sore throat, cough, abdominal pain, N/V, urinary tract symptoms, and leg swelling.      History provided by:  Patient and relative   used: No    Chest Pain  Associated symptoms: dizziness, palpitations, shortness of breath and weakness (Feeling off\" woozy\")    Associated symptoms: no abdominal pain, no back pain, no cough, no fever, no headache, no nausea and not vomiting        None       Past Medical History:   Diagnosis Date    Hypertension        History reviewed. No pertinent surgical history.    Family History   Problem Relation Age of Onset    Hypertension Mother     Cancer Father     Hypertension Father      I have reviewed and agree with the history as documented.    E-Cigarette/Vaping     E-Cigarette/Vaping Substances    Nicotine No  " "   THC No     CBD No     Flavoring No     Other No     Unknown No      Social History     Tobacco Use    Smoking status: Never    Smokeless tobacco: Never   Substance Use Topics    Alcohol use: Yes     Alcohol/week: 2.0 standard drinks of alcohol     Types: 2 Cans of beer per week    Drug use: No       Review of Systems   Constitutional:  Negative for chills and fever.   Eyes:  Negative for photophobia, pain and visual disturbance.   Respiratory:  Positive for shortness of breath. Negative for cough.    Cardiovascular:  Positive for chest pain and palpitations. Negative for leg swelling.   Gastrointestinal:  Negative for abdominal pain, diarrhea, nausea and vomiting.   Musculoskeletal:  Negative for back pain, gait problem, neck pain and neck stiffness.   Skin:  Negative for rash and wound.   Neurological:  Positive for dizziness, weakness (Feeling off\" woozy\") and light-headedness. Negative for facial asymmetry and headaches.   All other systems reviewed and are negative.      Physical Exam  Physical Exam  Vitals and nursing note reviewed.   Constitutional:       General: He is in acute distress (Evidencing mild discomfort and mild distress).      Appearance: Normal appearance. He is well-developed. He is not ill-appearing, toxic-appearing or diaphoretic.   HENT:      Head: Normocephalic and atraumatic.      Jaw: There is normal jaw occlusion. No trismus.      Nose: Nose normal.      Mouth/Throat:      Lips: Pink.      Mouth: Mucous membranes are moist.   Eyes:      Extraocular Movements: Extraocular movements intact.      Conjunctiva/sclera: Conjunctivae normal.      Pupils: Pupils are equal, round, and reactive to light.   Cardiovascular:      Rate and Rhythm: Normal rate and regular rhythm.      Pulses: Normal pulses.           Radial pulses are 2+ on the right side and 2+ on the left side.        Dorsalis pedis pulses are 2+ on the right side and 2+ on the left side.      Heart sounds: Normal heart sounds, S1 " normal and S2 normal. No murmur heard.  Pulmonary:      Effort: Pulmonary effort is normal. No tachypnea or respiratory distress.      Breath sounds: Normal breath sounds and air entry. No stridor, decreased air movement or transmitted upper airway sounds. No decreased breath sounds.   Musculoskeletal:         General: Normal range of motion.      Cervical back: Normal range of motion and neck supple.      Right lower leg: No edema.      Left lower leg: No edema.   Skin:     General: Skin is warm and dry.      Capillary Refill: Capillary refill takes less than 2 seconds.      Findings: No rash or wound.   Neurological:      General: No focal deficit present.      Mental Status: He is alert and oriented to person, place, and time. Mental status is at baseline.      GCS: GCS eye subscore is 4. GCS verbal subscore is 5. GCS motor subscore is 6.         Vital Signs  ED Triage Vitals   Temperature Pulse Respirations Blood Pressure SpO2   06/10/24 1417 06/10/24 1414 06/10/24 1414 06/10/24 1414 06/10/24 1414   98 °F (36.7 °C) 64 18 (!) 195/116 98 %      Temp Source Heart Rate Source Patient Position - Orthostatic VS BP Location FiO2 (%)   06/10/24 1417 06/10/24 1414 06/10/24 1414 06/10/24 1414 --   Temporal Monitor Sitting Right arm       Pain Score       06/10/24 1414       No Pain           Vitals:    06/10/24 1740 06/10/24 1810 06/10/24 1913 06/10/24 1937   BP: (!) 183/111 (!) 165/101 152/92 157/94   Pulse: 61 59 68 66   Patient Position - Orthostatic VS: Sitting            Visual Acuity      ED Medications  Medications   labetalol (NORMODYNE) injection 10 mg (10 mg Intravenous Given 6/10/24 1715)   iohexol (OMNIPAQUE) 350 MG/ML injection (MULTI-DOSE) 100 mL (100 mL Intravenous Given 6/10/24 1731)   labetalol (NORMODYNE) injection 10 mg (10 mg Intravenous Given 6/10/24 1843)   metoprolol succinate (TOPROL-XL) 24 hr tablet 25 mg (25 mg Oral Given 6/10/24 1936)       Diagnostic Studies  Results Reviewed       Procedure  Component Value Units Date/Time    D-Dimer [[id number]]  (Normal) Collected: 06/10/24 1659    Lab Status: Final result Specimen: Blood from Arm, Left Updated: 06/10/24 1820     D-Dimer, Quant <0.27 ug/ml FEU     Narrative:      In the evaluation for possible pulmonary embolism, in the appropriate (Well's Score of 4 or less) patient, the age adjusted d-dimer cutoff for this patient can be calculated as:    Age x 0.01 (in ug/mL) for Age-adjusted D-dimer exclusion threshold for a patient over 50 years.    HS Troponin I 2hr [[id number]]  (Normal) Collected: 06/10/24 1659    Lab Status: Final result Specimen: Blood from Arm, Left Updated: 06/10/24 1726     hs TnI 2hr 6 ng/L      Delta 2hr hsTnI 1 ng/L     TSH, 3rd generation with Free T4 reflex [[id number]]  (Normal) Collected: 06/10/24 1423    Lab Status: Final result Specimen: Blood from Arm, Left Updated: 06/10/24 1712     TSH 3RD GENERATON 1.541 uIU/mL     HS Troponin 0hr (reflex protocol) [[id number]]  (Normal) Collected: 06/10/24 1423    Lab Status: Final result Specimen: Blood from Arm, Left Updated: 06/10/24 1503     hs TnI 0hr 5 ng/L     Comprehensive metabolic panel [[id number]]  (Abnormal) Collected: 06/10/24 1423    Lab Status: Final result Specimen: Blood from Arm, Left Updated: 06/10/24 1454     Sodium 133 mmol/L      Potassium 3.9 mmol/L      Chloride 101 mmol/L      CO2 24 mmol/L      ANION GAP 8 mmol/L      BUN 18 mg/dL      Creatinine 0.83 mg/dL      Glucose 98 mg/dL      Calcium 9.1 mg/dL      AST 25 U/L      ALT 32 U/L      Alkaline Phosphatase 68 U/L      Total Protein 7.1 g/dL      Albumin 4.4 g/dL      Total Bilirubin 0.81 mg/dL      eGFR 96 ml/min/1.73sq m     Narrative:      National Kidney Disease Foundation guidelines for Chronic Kidney Disease (CKD):     Stage 1 with normal or high GFR (GFR > 90 mL/min/1.73 square meters)    Stage 2 Mild CKD (GFR = 60-89 mL/min/1.73 square meters)    Stage 3A Moderate CKD (GFR = 45-59 mL/min/1.73 square meters)     Stage 3B Moderate CKD (GFR = 30-44 mL/min/1.73 square meters)    Stage 4 Severe CKD (GFR = 15-29 mL/min/1.73 square meters)    Stage 5 End Stage CKD (GFR <15 mL/min/1.73 square meters)  Note: GFR calculation is accurate only with a steady state creatinine    CBC and differential [[id number]]  (Abnormal) Collected: 06/10/24 1423    Lab Status: Final result Specimen: Blood from Arm, Left Updated: 06/10/24 1436     WBC 6.07 Thousand/uL      RBC 4.66 Million/uL      Hemoglobin 14.6 g/dL      Hematocrit 43.3 %      MCV 93 fL      MCH 31.3 pg      MCHC 33.7 g/dL      RDW 11.4 %      MPV 9.8 fL      Platelets 167 Thousands/uL      nRBC 0 /100 WBCs      Segmented % 58 %      Immature Grans % 0 %      Lymphocytes % 29 %      Monocytes % 10 %      Eosinophils Relative 2 %      Basophils Relative 1 %      Absolute Neutrophils 3.52 Thousands/µL      Absolute Immature Grans 0.02 Thousand/uL      Absolute Lymphocytes 1.77 Thousands/µL      Absolute Monocytes 0.61 Thousand/µL      Eosinophils Absolute 0.11 Thousand/µL      Basophils Absolute 0.04 Thousands/µL                    CTA dissection protocol chest abdomen pelvis w wo contrast   Final Result by Tong Howard MD (06/10 3614)      No aortic aneurysm or dissection.      Approximate 50% narrowing of the left internal iliac artery as seen on image 604/86.      5 mm right upper lobe nodule. Based on current Fleischner Society 2017 Guidelines on incidental pulmonary nodule, no routine follow-up is needed if the patient is low risk. If the patient is high risk, optional follow-up chest CT at 12 months can be    considered.      Coronary artery calcifications indicative of atherosclerotic heart disease.      Hepatic steatosis.      Small hiatal hernia.                  Workstation performed: [id number]         CT head without contrast   Final Result by Elijah Leija MD (06/10 2212)      No acute intracranial abnormality.                  Workstation performed:  [id number]         XR chest 2 views   ED Interpretation by WOODY Chandra (06/10 1624)   No acute cardiopulmonary disease identified by me.      Final Result by Tong Howard MD (06/10 1855)      No acute cardiopulmonary disease.            Workstation performed: [id number]                    Procedures  ECG 12 Lead Documentation Only    Date/Time: 6/10/2024 4:21 PM    Performed by: WOODY Chandra  Authorized by: WOODY Chandra    Indications / Diagnosis:  Chest pain  ECG reviewed by me, the ED Provider: yes    Patient location:  ED  Previous ECG:     Previous ECG:  Unavailable  Interpretation:     Interpretation: non-specific    Rate:     ECG rate:  67    ECG rate assessment: normal    Rhythm:     Rhythm: sinus rhythm    Ectopy:     Ectopy: none    QRS:     QRS axis:  Normal    QRS intervals:  Normal  Conduction:     Conduction: normal    ST segments:     ST segments:  Normal  T waves:     T waves: inverted      Inverted:  III  Comments:      320 sinus rhythm with PVC, normal axis, normal intervals, nonspecific T wave inversion of lead III, no other acute ischemic changes read by me           ED Course  ED Course as of 06/11/24 0017   Mon Thor 10, 2024   1623 Blood Pressure(!): 195/116  Noted hypertension, will reassess/trend   1623 First nurse orders placed including CMP, CBC, troponin, EKG, two-view chest x-ray   1712 TSH 3RD GENERATON: 1.541  stable   1735 CT head without contrast  IMPRESSION:     No acute intracranial abnormality.     1825 D-Dimer, Quant: <0.27  Noted negative   1858 XR chest 2 views  IMPRESSION:     No acute cardiopulmonary disease.     1858 CTA dissection protocol chest abdomen pelvis w wo contrast  IMPRESSION:     No aortic aneurysm or dissection.     Approximate 50% narrowing of the left internal iliac artery as seen on image 604/86.     5 mm right upper lobe nodule. Based on current Fleischner Society 2017 Guidelines on incidental pulmonary nodule, no routine follow-up is  needed if the patient is low risk. If the patient is high risk, optional follow-up chest CT at 12 months can be   considered.     Coronary artery calcifications indicative of atherosclerotic heart disease.     Hepatic steatosis.     Small hiatal hernia.     1921 On reevaluation, patient feeling improved.  Currently has no complaints or chest pain.             HEART Risk Score      Flowsheet Row Most Recent Value   Heart Score Risk Calculator    History 1 Filed at: 06/10/2024 1727   ECG 0 Filed at: 06/10/2024 1727   Age 1 Filed at: 06/10/2024 1727   Risk Factors 1 Filed at: 06/10/2024 1727   Troponin 0 Filed at: 06/10/2024 1727   HEART Score 3 Filed at: 06/10/2024 1727                  PERC Rule for PE      Flowsheet Row Most Recent Value   PERC Rule for PE    Age >=50 1 Filed at: 06/10/2024 1639   HR >=100 0 Filed at: 06/10/2024 1639   O2 Sat on room air < 95% 0 Filed at: 06/10/2024 1639   History of PE or DVT 0 Filed at: 06/10/2024 1639   Recent trauma or surgery 0 Filed at: 06/10/2024 1639   Hemoptysis 0 Filed at: 06/10/2024 1639   Exogenous estrogen 0 Filed at: 06/10/2024 1639   Unilateral leg swelling 0 Filed at: 06/10/2024 1639   PERC Rule for PE Results 1 Filed at: 06/10/2024 1639                SBIRT 20yo+      Flowsheet Row Most Recent Value   Initial Alcohol Screen: US AUDIT-C     1. How often do you have a drink containing alcohol? 0 Filed at: 06/10/2024 1414   2. How many drinks containing alcohol do you have on a typical day you are drinking?  0 Filed at: 06/10/2024 1414   3a. Male UNDER 65: How often do you have five or more drinks on one occasion? 0 Filed at: 06/10/2024 1414   3b. FEMALE Any Age, or MALE 65+: How often do you have 4 or more drinks on one occassion? 0 Filed at: 06/10/2024 1414   Audit-C Score 0 Filed at: 06/10/2024 1414   LAKEISHA: How many times in the past year have you...    Used an illegal drug or used a prescription medication for non-medical reasons? Never Filed at: 06/10/2024 9329             Wells' Criteria for PE      Flowsheet Row Most Recent Value   Wells' Criteria for PE    Clinical signs and symptoms of DVT 0 Filed at: 06/10/2024 1638   PE is primary diagnosis or equally likely 0 Filed at: 06/10/2024 1638   HR >100 0 Filed at: 06/10/2024 1638   Immobilization at least 3 days or Surgery in the previous 4 weeks 0 Filed at: 06/10/2024 1638   Previous, objectively diagnosed PE or DVT 0 Filed at: 06/10/2024 1638   Hemoptysis 0 Filed at: 06/10/2024 1638   Malignancy with treatment within 6 months or palliative 0 Filed at: 06/10/2024 1638   Wells' Criteria Total 0 Filed at: 06/10/2024 1638                  Medical Decision Making  DDX including but not limited to: Hypertensive urgency, cardiac arrhythmia, thyroid dysfunction, ACS, PE    Patient with notably elevated blood pressure readings with nonspecific symptoms of lightheadedness, dizziness, chest pain, and shortness of breath.  He has been noncompliant with his metoprolol taking very infrequently.  CT head obtained without acute traumatic bleed.  Given diffuse symptoms and concern for dissection, CTA obtained.  No evidence of acute dissection.  Patient had response to labetalol and his symptoms all did improve.  Remainder of labs stable without evidence of endorgan dysfunction.  Suspect hypertensive urgency.  Given overall response to labetalol, will restart patient on his prescribed metoprolol and add dose in the evening.  Will have him  blood pressure cuff and monitor at home.  Have him closely follow-up with cardiology and primary care.  Return precautions discussed and the patient demonstrates understanding teach back method.  He has no further questions at this time.  He overall appears well, in no acute distress, and is ambulatory with steady gait at discharge.    Problems Addressed:  Chest pain: acute illness or injury  Hypertensive urgency: acute illness or injury    Amount and/or Complexity of Data Reviewed  Labs: ordered.  Decision-making details documented in ED Course.  Radiology: ordered and independent interpretation performed. Decision-making details documented in ED Course.  ECG/medicine tests: ordered and independent interpretation performed.    Risk  OTC drugs.  Prescription drug management.             Disposition  Final diagnoses:   Chest pain   Hypertensive urgency     Time reflects when diagnosis was documented in both MDM as applicable and the Disposition within this note       Time User Action Codes Description Comment    6/10/2024  7:22 PM Jacquie Mishra [R07.9] Chest pain     6/10/2024  7:22 PM Jacquie Mishra [I16.0] Hypertensive urgency           ED Disposition       ED Disposition   Discharge    Condition   Stable    Date/Time   Mon Thor 10, 2024 1922    Comment   Angel Makayla discharge to home/self care.                   Follow-up Information       Follow up With Specialties Details Why Contact Info Additional Information    [facility] Cardiology Call in 1 day  [address]  [facility] [address]  [phone] [facility], [address], [place], [place], [address]   [phone]     [facility] Emergency Department Emergency Medicine Go to  If symptoms worsen [address] [address]  [phone] [facility] Emergency Department, [address], [place] [address]            Discharge Medication List as of 6/10/2024  7:25 PM        START taking these medications    Details   metoprolol succinate (TOPROL-XL) 25 mg 24 hr tablet Take 1 tablet (25 mg total) by mouth 2 (two) times a day Do not start before June 11, 2024., Starting Tue 6/11/2024, Until Thu 7/11/2024, Normal                 PDMP Review       None            ED Provider  Electronically Signed by             WOODY Chandra  06/11/24 0017

## 2024-06-10 NOTE — DISCHARGE INSTRUCTIONS
Schedule an appointment with cardiology for reevaluation of your symptoms in 3 to 5 days.  Start taking the 25 mg of metoprolol succinate (Toprol XL) twice daily until seen by cardiology.  Purchase an over-the-counter blood pressure cuff and monitor your blood pressure.  Write down the blood pressure once a day so you may bring this information to both primary care and cardiology so they can titrate medications as needed.    Return to the ER if you develop severe chest pain, severe headache, lightheadedness or passing out, weakness, vomiting, difficulty breathing, confusion, or lethargy.

## 2024-10-09 ENCOUNTER — APPOINTMENT (EMERGENCY)
Dept: RADIOLOGY | Facility: HOSPITAL | Age: 59
End: 2024-10-09
Payer: COMMERCIAL

## 2024-10-09 ENCOUNTER — HOSPITAL ENCOUNTER (EMERGENCY)
Facility: HOSPITAL | Age: 59
Discharge: HOME | End: 2024-10-09
Attending: EMERGENCY MEDICINE
Payer: COMMERCIAL

## 2024-10-09 VITALS
OXYGEN SATURATION: 99 % | HEIGHT: 69 IN | HEART RATE: 63 BPM | DIASTOLIC BLOOD PRESSURE: 86 MMHG | TEMPERATURE: 97.4 F | SYSTOLIC BLOOD PRESSURE: 133 MMHG | BODY MASS INDEX: 25.18 KG/M2 | RESPIRATION RATE: 16 BRPM | WEIGHT: 170 LBS

## 2024-10-09 DIAGNOSIS — S93.401A RIGHT ANKLE SPRAIN: Primary | ICD-10-CM

## 2024-10-09 PROCEDURE — 99284 EMERGENCY DEPT VISIT MOD MDM: CPT | Performed by: EMERGENCY MEDICINE

## 2024-10-09 PROCEDURE — 99283 EMERGENCY DEPT VISIT LOW MDM: CPT

## 2024-10-09 PROCEDURE — 73610 X-RAY EXAM OF ANKLE: CPT

## 2024-10-09 NOTE — ED PROVIDER NOTES
"Final diagnoses:   Right ankle sprain     ED Disposition       ED Disposition   Discharge    Condition   Stable    Date/Time   Wed Oct 9, 2024 10:06 AM    Comment   Angel Benavides discharge to home/self care.                   Assessment & Plan       Medical Decision Making  58-year-old male with a right ankle injury that occurred yesterday.  Obtain x-ray to rule out fracture.  Supportive care as needed.    Discussed results.  Aircast splint.  Follow-up with orthopedics as needed.  Return precautions discussed    Amount and/or Complexity of Data Reviewed  Radiology: ordered.             Medications - No data to display    ED Risk Strat Scores                           SBIRT 22yo+      Flowsheet Row Most Recent Value   Initial Alcohol Screen: US AUDIT-C     1. How often do you have a drink containing alcohol? 0 Filed at: 10/09/2024 0926   2. How many drinks containing alcohol do you have on a typical day you are drinking?  0 Filed at: 10/09/2024 0926   3a. Male UNDER 65: How often do you have five or more drinks on one occasion? 0 Filed at: 10/09/2024 0926   Audit-C Score 0 Filed at: 10/09/2024 0926   LAKEISHA: How many times in the past year have you...    Used an illegal drug or used a prescription medication for non-medical reasons? Never Filed at: 10/09/2024 0926                            History of Present Illness       Chief Complaint   Patient presents with    Ankle Pain     Comes to ed c/o right ankle pain after \"rolling it yesterday\". Right ankle appears swollen. Patient able to ambulate.       Past Medical History:   Diagnosis Date    Hypertension       History reviewed. No pertinent surgical history.   Family History   Problem Relation Age of Onset    Hypertension Mother     Cancer Father     Hypertension Father       Social History     Tobacco Use    Smoking status: Never    Smokeless tobacco: Never   Vaping Use    Vaping status: Never Used   Substance Use Topics    Alcohol use: Yes     Alcohol/week: 2.0 " standard drinks of alcohol     Types: 2 Cans of beer per week     Comment: socially    Drug use: Never      E-Cigarette/Vaping    E-Cigarette Use Never User       E-Cigarette/Vaping Substances    Nicotine No     THC No     CBD No     Flavoring No     Other No     Unknown No       I have reviewed and agree with the history as documented.     58-year-old male presenting with right anterior lateral ankle swelling after twisting his ankle while coming down a step yesterday.  Denies any further injuries.  Patient states swelling significantly improved since yesterday as he applied ice and elevated.  Minimal pain at this time.  Has been bearing weight.        Review of Systems   Constitutional:  Negative for fever.           Objective       ED Triage Vitals [10/09/24 0924]   Temperature Pulse Blood Pressure Respirations SpO2 Patient Position - Orthostatic VS   (!) 97.4 °F (36.3 °C) 63 133/86 16 99 % Sitting      Temp Source Heart Rate Source BP Location FiO2 (%) Pain Score    Oral Monitor Right arm -- 5      Vitals      Date and Time Temp Pulse SpO2 Resp BP Pain Score FACES Pain Rating User   10/09/24 1000 -- -- -- -- -- No Pain -- EW   10/09/24 0924 97.4 °F (36.3 °C) 63 99 % 16 133/86 5 -- BY            Physical Exam  Vitals and nursing note reviewed.   Constitutional:       General: He is not in acute distress.     Appearance: He is well-developed.   HENT:      Head: Normocephalic and atraumatic.      Right Ear: External ear normal.      Left Ear: External ear normal.      Nose: Nose normal.   Eyes:      General: No scleral icterus.  Pulmonary:      Effort: Pulmonary effort is normal. No respiratory distress.   Abdominal:      General: There is no distension.      Palpations: Abdomen is soft.   Musculoskeletal:         General: Tenderness present. No deformity. Normal range of motion.      Cervical back: Normal range of motion and neck supple.      Comments: Mild swelling to right anterior lateral ankle over ATF.   Intact pulses.  Achilles intact.  Normal plantar and dorsiflexion.  Mildly tender over right lateral malleolus   Skin:     General: Skin is warm.      Findings: No rash.   Neurological:      General: No focal deficit present.      Mental Status: He is alert.      Gait: Gait normal.   Psychiatric:         Mood and Affect: Mood normal.         Results Reviewed       None            XR ankle 3+ views RIGHT    (Results Pending)       Procedures    ED Medication and Procedure Management   Prior to Admission Medications   Prescriptions Last Dose Informant Patient Reported? Taking?   metoprolol succinate (TOPROL-XL) 25 mg 24 hr tablet   No No   Sig: Take 1 tablet (25 mg total) by mouth 2 (two) times a day Do not start before June 11, 2024.      Facility-Administered Medications: None     Discharge Medication List as of 10/9/2024 10:06 AM        CONTINUE these medications which have NOT CHANGED    Details   metoprolol succinate (TOPROL-XL) 25 mg 24 hr tablet Take 1 tablet (25 mg total) by mouth 2 (two) times a day Do not start before June 11, 2024., Starting Tue 6/11/2024, Until Thu 7/11/2024, Normal           No discharge procedures on file.  ED SEPSIS DOCUMENTATION   Time reflects when diagnosis was documented in both MDM as applicable and the Disposition within this note       Time User Action Codes Description Comment    10/9/2024 10:06 AM Chaim Worrell Add [S93.401A] Right ankle sprain                  Chaim Worrell DO  10/09/24 110

## 2024-11-18 ENCOUNTER — OFFICE VISIT (OUTPATIENT)
Age: 59
End: 2024-11-18
Payer: COMMERCIAL

## 2024-11-18 VITALS
SYSTOLIC BLOOD PRESSURE: 110 MMHG | WEIGHT: 174 LBS | HEART RATE: 68 BPM | DIASTOLIC BLOOD PRESSURE: 72 MMHG | BODY MASS INDEX: 25.7 KG/M2 | OXYGEN SATURATION: 100 % | TEMPERATURE: 97.3 F

## 2024-11-18 DIAGNOSIS — G47.33 OSA (OBSTRUCTIVE SLEEP APNEA): Primary | ICD-10-CM

## 2024-11-18 DIAGNOSIS — R91.1 PULMONARY NODULE: ICD-10-CM

## 2024-11-18 PROCEDURE — 99204 OFFICE O/P NEW MOD 45 MIN: CPT | Performed by: INTERNAL MEDICINE

## 2024-11-18 RX ORDER — LOSARTAN POTASSIUM 25 MG/1
1 TABLET ORAL DAILY
COMMUNITY
Start: 2024-09-18

## 2024-11-18 NOTE — LETTER
November 18, 2024     Jens Lares MD  [address] [facility] [address]    Patient: Angel Benavides   YOB: 1965   Date of Visit: 11/18/2024       Dear Dr. Lares:    Thank you for referring Angel Benavides to me for evaluation. Below are my notes for this consultation.    If you have questions, please do not hesitate to call me. I look forward to following your patient along with you.         Sincerely,        Paresh More DO        CC: Angel Benavides    Paresh DO Derik  11/18/2024 12:42 PM  Sign when Signing Visit  Pulmonary Consultation   Angel Benavides 59 y.o. male MRN: [id number]      Reason for consultation: ESCOBAR, lung nodule    Requesting physician: Dr. Lares    Assessment/Plan  59 y.o. M with PMHx of Bigeminy, HTN, hyperlipidemia who comes in for management of ESCOBAR, lung nodule.  1.  Severe ESCOBAR (AHI - 49.7)       -  Start trial of autoCPAP 5-20 today, follow compliance data in 1- 2 months      -  Discussed in depth the results of the sleep study and treatment pitfalls prior to initiation.  Answered all questions regarding treatment      -  I also discussed in depth the risk of leaving sleep apnea untreated including hypertension, heart failure, arrhythmia, MI and stroke.    2.  5 mm RUL lung nodule - family history of lung cancer.  Lifelong nonsmoker.       -  Repeat CT chest in June 2025 for 1 year follow up    3.  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  While mask was utilized, he still feels he was inhaling the dust.        History of Present Illness  HPI:  Angel Benavides is a 59 y.o. male with PMHx as below who comes in for management of ESCOBAR.  Patient notes difficulty staying asleep, snoring, awakenings with snoring, witnessed apneas, and awakenings with dry mouth.  he denies symptoms of excessive daytime sleepiness with an Denton score of 2 or restless legs.  he denies symptoms of cataplexy, sleep paralysis,  hypnopompic or hypnagogic hallucinations.         Sleep History:  he goes to bed at approximately 11, will get to sleep in a few min, will get out of bed at 7 am.  he will get up 3-6 times at night for unknown reason.  It will then take a few minutes to fall back asleep.   he does occasionally nap for 30 min - 1 hr.  He does feel better with those naps.    In addition, he was also referred for an abnormal CT chest.  He had it performed when he went to the ER for chest pain in June.  He has a family history of lung cancer and was exposed when he was younger.  He has never smoked.   He denies any shortness of breath, chest tightness, night sweats, weight loss, night sweats or fatigue.  Overall his weight has also been stable.  He is very active and does not get limitations with his activity.      ROS:   Review of Systems   Constitutional:  Positive for fatigue.   Eyes: Negative.    Respiratory:  Positive for cough and wheezing.    Cardiovascular: Negative.    Gastrointestinal: Negative.    Endocrine: Negative.    Genitourinary: Negative.    Musculoskeletal: Negative.    Skin: Negative.    Neurological: Negative.    Hematological: Negative.    Psychiatric/Behavioral:  Positive for sleep disturbance.        Historical Information  Past Medical History:   Diagnosis Date   • Hypertension      History reviewed. No pertinent surgical history.  Family History   Problem Relation Age of Onset   • Hypertension Mother    • Cancer Father    • Hypertension Father      Social History     Socioeconomic History   • Marital status:      Spouse name: Not on file   • Number of children: Not on file   • Years of education: Not on file   • Highest education level: Not on file   Occupational History   • Not on file   Tobacco Use   • Smoking status: Never   • Smokeless tobacco: Never   Vaping Use   • Vaping status: Never Used   Substance and Sexual Activity   • Alcohol use: Yes     Alcohol/week: 2.0 standard drinks of alcohol      Types: 2 Cans of beer per week     Comment: socially   • Drug use: Never   • Sexual activity: Not on file   Other Topics Concern   • Not on file   Social History Narrative   • Not on file     Social Drivers of Health     Financial Resource Strain: Not on file   Food Insecurity: Not on file   Transportation Needs: Not on file   Physical Activity: Not on file   Stress: Not on file   Social Connections: Not on file   Intimate Partner Violence: Not on file   Housing Stability: Not on file       Occupational History: Furniture refinishing.      Meds/Allergies  No Known Allergies    Home medications:  Prior to Admission medications    Medication Sig Start Date End Date Taking? Authorizing Provider   losartan (COZAAR) 25 mg tablet Take 1 tablet by mouth in the morning 9/18/24  Yes Historical Provider, MD   metoprolol succinate (TOPROL-XL) 25 mg 24 hr tablet Take 1 tablet (25 mg total) by mouth 2 (two) times a day Do not start before June 11, 2024. 6/11/24 11/18/24 Yes WOODY Chandra       Vitals:   Blood pressure 110/72, pulse 68, temperature (!) 97.3 °F (36.3 °C), temperature source Temporal, weight 78.9 kg (174 lb), SpO2 100%., RA, Body mass index is 25.7 kg/m².       Physical Exam  General: Pleasant, Awake alert and oriented x 3, conversant without conversational dyspnea, NAD, normal affect  HEENT:  PERRL, Sclera noninjected, nonicteric OU, Nares patent,  no craniofacial abnormalities, Mucous membranes, moist, no oral lesions, normal dentition, Mallampati class 4  NECK: Trachea midline, no accessory muscle use, no stridor, no cervical or supraclavicular adenopathy, JVP not elevated  CARDIAC: Reg, single s1/S2, no m/r/g  PULM: CTA bilaterally no wheezing, rhonchi or rales  ABD: Normoactive bowel sounds, soft nontender, nondistended, no rebound, no rigidity, no guarding  EXT: No cyanosis, no clubbing, no edema, normal capillary refill  NEURO: no focal neurologic deficits, AAOx3, moving all extremities  appropriately    Labs: I have personally reviewed pertinent lab results.  Lab Results   Component Value Date    WBC 6.07 06/10/2024    HGB 14.6 06/10/2024    HCT 43.3 06/10/2024    MCV 93 06/10/2024     06/10/2024      Lab Results   Component Value Date    CALCIUM 9.1 06/10/2024    K 3.9 06/10/2024    CO2 24 06/10/2024     06/10/2024    BUN 18 06/10/2024    CREATININE 0.83 06/10/2024       PFTs:  The most recent pulmonary function tests were reviewed.  None    Imaging  I personally reviewed the images on the PAC system pertinent to today's visit  CTA chest  6/10/24  IMPRESSION:  No aortic aneurysm or dissection.  Approximate 50% narrowing of the left internal iliac artery as seen on image 604/86.  5 mm right upper lobe nodule. Based on current Fleischner Society 2017 Guidelines on incidental pulmonary nodule, no routine follow-up is needed if the patient is low risk. If the patient is high risk, optional follow-up chest CT at 12 months can be   considered.  Coronary artery calcifications indicative of atherosclerotic heart disease.  Hepatic steatosis.  Small hiatal hernia.    Cardiac:  No echo    Sleep studies:  Diagnostic:  [place] 9/19/24  AHI - 49.7, nell O2 - 85%  REM sleep - 19.7%  Stage 3 - 8%                                         Paresh More DO  [facility]'s Sleep Physician

## 2024-11-18 NOTE — PROGRESS NOTES
Pulmonary Consultation   Angel Benavides 59 y.o. male MRN: [id number]      Reason for consultation: ESCOBAR, lung nodule    Requesting physician: Dr. Lares    Assessment/Plan  59 y.o. M with PMHx of Bigeminy, HTN, hyperlipidemia who comes in for management of ESCOBAR, lung nodule.  1.  Severe ESCOBAR (AHI - 49.7)       -  Start trial of autoCPAP 5-20 today, follow compliance data in 1- 2 months      -  Discussed in depth the results of the sleep study and treatment pitfalls prior to initiation.  Answered all questions regarding treatment      -  I also discussed in depth the risk of leaving sleep apnea untreated including hypertension, heart failure, arrhythmia, MI and stroke.    2.  5 mm RUL lung nodule - family history of lung cancer.  Lifelong nonsmoker.       -  Repeat CT chest in June 2025 for 1 year follow up    3.  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  While mask was utilized, he still feels he was inhaling the dust.        History of Present Illness   HPI:  Angel Benavides is a 59 y.o. male with PMHx as below who comes in for management of ESCOBAR.  Patient notes difficulty staying asleep, snoring, awakenings with snoring, witnessed apneas, and awakenings with dry mouth.  he denies symptoms of excessive daytime sleepiness with an Sumner score of 2 or restless legs.  he denies symptoms of cataplexy, sleep paralysis, hypnopompic or hypnagogic hallucinations.         Sleep History:  he goes to bed at approximately 11, will get to sleep in a few min, will get out of bed at 7 am.  he will get up 3-6 times at night for unknown reason.  It will then take a few minutes to fall back asleep.   he does occasionally nap for 30 min - 1 hr.  He does feel better with those naps.    In addition, he was also referred for an abnormal CT chest.  He had it performed when he went to the ER for chest pain in June.  He has a family history of lung cancer and was exposed when he was  younger.  He has never smoked.   He denies any shortness of breath, chest tightness, night sweats, weight loss, night sweats or fatigue.  Overall his weight has also been stable.  He is very active and does not get limitations with his activity.      ROS:   Review of Systems   Constitutional:  Positive for fatigue.   Eyes: Negative.    Respiratory:  Positive for cough and wheezing.    Cardiovascular: Negative.    Gastrointestinal: Negative.    Endocrine: Negative.    Genitourinary: Negative.    Musculoskeletal: Negative.    Skin: Negative.    Neurological: Negative.    Hematological: Negative.    Psychiatric/Behavioral:  Positive for sleep disturbance.        Historical Information   Past Medical History:   Diagnosis Date    Hypertension      History reviewed. No pertinent surgical history.  Family History   Problem Relation Age of Onset    Hypertension Mother     Cancer Father     Hypertension Father      Social History     Socioeconomic History    Marital status:      Spouse name: Not on file    Number of children: Not on file    Years of education: Not on file    Highest education level: Not on file   Occupational History    Not on file   Tobacco Use    Smoking status: Never    Smokeless tobacco: Never   Vaping Use    Vaping status: Never Used   Substance and Sexual Activity    Alcohol use: Yes     Alcohol/week: 2.0 standard drinks of alcohol     Types: 2 Cans of beer per week     Comment: socially    Drug use: Never    Sexual activity: Not on file   Other Topics Concern    Not on file   Social History Narrative    Not on file     Social Drivers of Health     Financial Resource Strain: Not on file   Food Insecurity: Not on file   Transportation Needs: Not on file   Physical Activity: Not on file   Stress: Not on file   Social Connections: Not on file   Intimate Partner Violence: Not on file   Housing Stability: Not on file       Occupational History: Furniture refinishing.      Meds/Allergies   No Known  Allergies    Home medications:  Prior to Admission medications    Medication Sig Start Date End Date Taking? Authorizing Provider   losartan (COZAAR) 25 mg tablet Take 1 tablet by mouth in the morning 9/18/24  Yes Historical Provider, MD   metoprolol succinate (TOPROL-XL) 25 mg 24 hr tablet Take 1 tablet (25 mg total) by mouth 2 (two) times a day Do not start before June 11, 2024. 6/11/24 11/18/24 Yes WOODY Chandra       Vitals:   Blood pressure 110/72, pulse 68, temperature (!) 97.3 °F (36.3 °C), temperature source Temporal, weight 78.9 kg (174 lb), SpO2 100%., RA, Body mass index is 25.7 kg/m².       Physical Exam  General: Pleasant, Awake alert and oriented x 3, conversant without conversational dyspnea, NAD, normal affect  HEENT:  PERRL, Sclera noninjected, nonicteric OU, Nares patent,  no craniofacial abnormalities, Mucous membranes, moist, no oral lesions, normal dentition, Mallampati class 4  NECK: Trachea midline, no accessory muscle use, no stridor, no cervical or supraclavicular adenopathy, JVP not elevated  CARDIAC: Reg, single s1/S2, no m/r/g  PULM: CTA bilaterally no wheezing, rhonchi or rales  ABD: Normoactive bowel sounds, soft nontender, nondistended, no rebound, no rigidity, no guarding  EXT: No cyanosis, no clubbing, no edema, normal capillary refill  NEURO: no focal neurologic deficits, AAOx3, moving all extremities appropriately    Labs: I have personally reviewed pertinent lab results.  Lab Results   Component Value Date    WBC 6.07 06/10/2024    HGB 14.6 06/10/2024    HCT 43.3 06/10/2024    MCV 93 06/10/2024     06/10/2024      Lab Results   Component Value Date    CALCIUM 9.1 06/10/2024    K 3.9 06/10/2024    CO2 24 06/10/2024     06/10/2024    BUN 18 06/10/2024    CREATININE 0.83 06/10/2024       PFTs:  The most recent pulmonary function tests were reviewed.  None    Imaging  I personally reviewed the images on the PAC system pertinent to today's visit  CTA chest   6/10/24  IMPRESSION:  No aortic aneurysm or dissection.  Approximate 50% narrowing of the left internal iliac artery as seen on image 604/86.  5 mm right upper lobe nodule. Based on current Fleischner Society 2017 Guidelines on incidental pulmonary nodule, no routine follow-up is needed if the patient is low risk. If the patient is high risk, optional follow-up chest CT at 12 months can be   considered.  Coronary artery calcifications indicative of atherosclerotic heart disease.  Hepatic steatosis.  Small hiatal hernia.    Cardiac:  No echo    Sleep studies:  Diagnostic:  [place] 9/19/24  AHI - 49.7, nell O2 - 85%  REM sleep - 19.7%  Stage 3 - 8%                                         Paresh More DO  [facility] Sleep Physician     (2) Forgets Limitations

## 2024-11-22 LAB

## 2024-12-04 LAB
DME PARACHUTE DELIVERY DATE EXPECTED: NORMAL
DME PARACHUTE DELIVERY DATE REQUESTED: NORMAL
DME PARACHUTE ITEM DESCRIPTION: NORMAL
DME PARACHUTE ORDER STATUS: NORMAL
DME PARACHUTE SUPPLIER NAME: NORMAL
DME PARACHUTE SUPPLIER PHONE: NORMAL

## 2024-12-10 LAB

## 2025-03-23 LAB
